# Patient Record
Sex: FEMALE | Race: OTHER | Employment: FULL TIME | URBAN - METROPOLITAN AREA
[De-identification: names, ages, dates, MRNs, and addresses within clinical notes are randomized per-mention and may not be internally consistent; named-entity substitution may affect disease eponyms.]

---

## 2017-10-14 ENCOUNTER — OCC HEALTH (OUTPATIENT)
Dept: OCCUPATIONAL MEDICINE | Age: 22
End: 2017-10-14
Attending: PREVENTIVE MEDICINE

## 2017-10-27 ENCOUNTER — APPOINTMENT (OUTPATIENT)
Dept: GENERAL RADIOLOGY | Age: 22
End: 2017-10-27
Attending: EMERGENCY MEDICINE
Payer: COMMERCIAL

## 2017-10-27 ENCOUNTER — HOSPITAL ENCOUNTER (OUTPATIENT)
Age: 22
Discharge: HOME | End: 2017-10-27
Attending: EMERGENCY MEDICINE
Payer: COMMERCIAL

## 2017-10-27 VITALS
TEMPERATURE: 98 F | HEART RATE: 75 BPM | SYSTOLIC BLOOD PRESSURE: 123 MMHG | RESPIRATION RATE: 18 BRPM | OXYGEN SATURATION: 99 % | DIASTOLIC BLOOD PRESSURE: 90 MMHG

## 2017-10-27 DIAGNOSIS — M54.12 CERVICAL RADICULAR PAIN: Primary | ICD-10-CM

## 2017-10-27 PROCEDURE — 73030 X-RAY EXAM OF SHOULDER: CPT | Performed by: EMERGENCY MEDICINE

## 2017-10-27 PROCEDURE — 99203 OFFICE O/P NEW LOW 30 MIN: CPT

## 2017-10-27 PROCEDURE — 72050 X-RAY EXAM NECK SPINE 4/5VWS: CPT | Performed by: EMERGENCY MEDICINE

## 2017-10-27 PROCEDURE — 99214 OFFICE O/P EST MOD 30 MIN: CPT

## 2017-10-27 RX ORDER — ORPHENADRINE CITRATE 100 MG/1
100 TABLET, EXTENDED RELEASE ORAL 2 TIMES DAILY
Qty: 10 TABLET | Refills: 0 | Status: SHIPPED | OUTPATIENT
Start: 2017-10-27 | End: 2017-10-27

## 2017-10-27 RX ORDER — ORPHENADRINE CITRATE 100 MG/1
100 TABLET, EXTENDED RELEASE ORAL 2 TIMES DAILY
Qty: 20 TABLET | Refills: 0 | Status: SHIPPED | OUTPATIENT
Start: 2017-10-27 | End: 2018-03-06

## 2017-10-27 RX ORDER — ORPHENADRINE CITRATE 100 MG/1
100 TABLET, EXTENDED RELEASE ORAL 2 TIMES DAILY
Qty: 10 TABLET | Refills: 0 | Status: SHIPPED | OUTPATIENT
Start: 2017-10-27 | End: 2017-11-03

## 2017-10-27 RX ORDER — HYDROCODONE BITARTRATE AND ACETAMINOPHEN 5; 325 MG/1; MG/1
1-2 TABLET ORAL EVERY 6 HOURS PRN
Qty: 20 TABLET | Refills: 0 | Status: SHIPPED | OUTPATIENT
Start: 2017-10-27 | End: 2017-11-06

## 2017-10-27 RX ORDER — NAPROXEN 500 MG/1
500 TABLET ORAL 2 TIMES DAILY PRN
Qty: 20 TABLET | Refills: 0 | Status: SHIPPED | OUTPATIENT
Start: 2017-10-27 | End: 2017-10-27

## 2017-10-27 RX ORDER — NAPROXEN 500 MG/1
500 TABLET ORAL 2 TIMES DAILY PRN
Qty: 20 TABLET | Refills: 0 | Status: SHIPPED | OUTPATIENT
Start: 2017-10-27 | End: 2017-11-03

## 2017-10-27 NOTE — ED NOTES
Pt upset due to long wait for prescription and discharge paper. pt informed writer will let provider know.   Dr. Chato Urbina aware

## 2017-10-27 NOTE — ED INITIAL ASSESSMENT (HPI)
States 5 days ago developed left shoulder pain with tingling down her left arm  Denies any trauma or injury

## 2017-10-27 NOTE — ED NOTES
Patient states she flew into town and came here directly to be assessed. States we are incompetent with her time and states she is upset because she has to catch another flight to [place] in a few hours and needs to be at the airport soon to check in.

## 2017-10-27 NOTE — ED PROVIDER NOTES
Patient Seen in: THE [facility] In [facility] END    History   Patient presents with:  Shoulder Pain    Stated Complaint: 5 days lt shoulder pain no fall or injury    HPI    Patient complains of pain in the left shoulder area that started about 5 day HPI.  Constitutional and vital signs reviewed. All other systems reviewed and negative except as noted above. PSFH elements reviewed from today and agreed except as otherwise stated in HPI.     Physical Exam   ED Triage Vitals [10/27/17 1411]  BP: 1 abnormality is seen. OTHER:  Negative    Shoulder series  There is no fracture or dislocation. There is no lytic or blastic lesions. The soft tissues are unremarkable    I recommend rest, anti-inflammatories and muscle relaxants.   If patient's pain does

## 2018-03-06 ENCOUNTER — HOSPITAL ENCOUNTER (OUTPATIENT)
Dept: ULTRASOUND IMAGING | Age: 23
Discharge: HOME | End: 2018-03-06
Attending: OBSTETRICS & GYNECOLOGY
Payer: COMMERCIAL

## 2018-03-06 ENCOUNTER — OFFICE VISIT (OUTPATIENT)
Dept: OBGYN CLINIC | Facility: CLINIC | Age: 23
End: 2018-03-06

## 2018-03-06 VITALS
HEIGHT: 62 IN | HEART RATE: 62 BPM | SYSTOLIC BLOOD PRESSURE: 102 MMHG | WEIGHT: 122.19 LBS | DIASTOLIC BLOOD PRESSURE: 60 MMHG | BODY MASS INDEX: 22.48 KG/M2

## 2018-03-06 DIAGNOSIS — N94.10 DYSPAREUNIA IN FEMALE: ICD-10-CM

## 2018-03-06 DIAGNOSIS — Z01.419 WELL FEMALE EXAM WITH ROUTINE GYNECOLOGICAL EXAM: Primary | ICD-10-CM

## 2018-03-06 DIAGNOSIS — Z11.3 SCREENING EXAMINATION FOR STD (SEXUALLY TRANSMITTED DISEASE): ICD-10-CM

## 2018-03-06 DIAGNOSIS — Z30.41 ENCOUNTER FOR SURVEILLANCE OF CONTRACEPTIVE PILLS: ICD-10-CM

## 2018-03-06 PROCEDURE — 76856 US EXAM PELVIC COMPLETE: CPT | Performed by: OBSTETRICS & GYNECOLOGY

## 2018-03-06 PROCEDURE — 76830 TRANSVAGINAL US NON-OB: CPT | Performed by: OBSTETRICS & GYNECOLOGY

## 2018-03-06 PROCEDURE — 87591 N.GONORRHOEAE DNA AMP PROB: CPT | Performed by: OBSTETRICS & GYNECOLOGY

## 2018-03-06 PROCEDURE — 99385 PREV VISIT NEW AGE 18-39: CPT | Performed by: OBSTETRICS & GYNECOLOGY

## 2018-03-06 PROCEDURE — 87491 CHLMYD TRACH DNA AMP PROBE: CPT | Performed by: OBSTETRICS & GYNECOLOGY

## 2018-03-06 RX ORDER — DROSPIRENONE AND ETHINYL ESTRADIOL 0.03MG-3MG
1 KIT ORAL
Qty: 3 PACKAGE | Refills: 3 | Status: SHIPPED | OUTPATIENT
Start: 2018-03-06 | End: 2019-05-20

## 2018-03-06 NOTE — PROGRESS NOTES
GYN H&P     3/6/2018  12:20 PM    CC: Patient is here for annual    HPI: patient is a 21year old  here for her annual gyn exam.   She has complaints pf dyspareunia. Menses are regular. Denies any pelvic pain or irregular vaginal discharge.    States Smoking status: Never Smoker    Smokeless tobacco: Never Used    Alcohol use Yes    Comment: socail    Drug use: No    Sexual activity: Yes    Partners: Male    Birth control/ protection: Pill     Other Topics Concern    Caffeine Concern Yes    Rashid Almonte appearing, no lesions. Urethral meatus appear wnl, no abnormal discharge or lesions noted.  Clitoris appear normal          Bladder: well supported, urethra wnl, no lesions or fissures                     Vagina: normal pink mucosa, no lesions, normal clear

## 2018-03-07 ENCOUNTER — PATIENT MESSAGE (OUTPATIENT)
Dept: OBGYN CLINIC | Facility: CLINIC | Age: 23
End: 2018-03-07

## 2018-03-07 DIAGNOSIS — N94.10 DYSPAREUNIA IN FEMALE: Primary | ICD-10-CM

## 2018-03-07 LAB
C TRACH DNA SPEC QL NAA+PROBE: NEGATIVE
N GONORRHOEA DNA SPEC QL NAA+PROBE: NEGATIVE

## 2018-03-07 NOTE — TELEPHONE ENCOUNTER
From: Junie Kumar RN  To: Cherelle Giraldo  Sent: 3/7/2018 9:36 AM CST  Subject: Results    Dr. Lena Trivedi reviewed your ultrasound and it is normal.    If you are interested in seeing pelvic medicine for ongoing pelvic pain we can refer you t

## 2019-04-01 ENCOUNTER — HOSPITAL ENCOUNTER (OUTPATIENT)
Dept: GENERAL RADIOLOGY | Age: 24
Discharge: HOME | End: 2019-04-01
Attending: INTERNAL MEDICINE
Payer: COMMERCIAL

## 2019-04-01 ENCOUNTER — OFFICE VISIT (OUTPATIENT)
Dept: RHEUMATOLOGY | Facility: CLINIC | Age: 24
End: 2019-04-01
Payer: COMMERCIAL

## 2019-04-01 ENCOUNTER — LAB ENCOUNTER (OUTPATIENT)
Dept: LAB | Age: 24
End: 2019-04-01
Attending: INTERNAL MEDICINE
Payer: COMMERCIAL

## 2019-04-01 VITALS
DIASTOLIC BLOOD PRESSURE: 80 MMHG | HEART RATE: 94 BPM | HEIGHT: 62 IN | WEIGHT: 112 LBS | SYSTOLIC BLOOD PRESSURE: 112 MMHG | BODY MASS INDEX: 20.61 KG/M2

## 2019-04-01 DIAGNOSIS — M25.561 CHRONIC PAIN OF BOTH KNEES: ICD-10-CM

## 2019-04-01 DIAGNOSIS — M25.561 CHRONIC PAIN OF BOTH KNEES: Primary | ICD-10-CM

## 2019-04-01 DIAGNOSIS — R20.0 NUMBNESS AND TINGLING OF FOOT: ICD-10-CM

## 2019-04-01 DIAGNOSIS — M25.562 CHRONIC PAIN OF BOTH KNEES: Primary | ICD-10-CM

## 2019-04-01 DIAGNOSIS — M25.562 CHRONIC PAIN OF BOTH KNEES: ICD-10-CM

## 2019-04-01 DIAGNOSIS — G89.29 CHRONIC PAIN OF BOTH KNEES: Primary | ICD-10-CM

## 2019-04-01 DIAGNOSIS — G89.29 CHRONIC PAIN OF BOTH KNEES: ICD-10-CM

## 2019-04-01 DIAGNOSIS — R20.2 NUMBNESS AND TINGLING OF FOOT: ICD-10-CM

## 2019-04-01 PROCEDURE — 84100 ASSAY OF PHOSPHORUS: CPT

## 2019-04-01 PROCEDURE — 85652 RBC SED RATE AUTOMATED: CPT | Performed by: INTERNAL MEDICINE

## 2019-04-01 PROCEDURE — 85025 COMPLETE CBC W/AUTO DIFF WBC: CPT | Performed by: INTERNAL MEDICINE

## 2019-04-01 PROCEDURE — 82728 ASSAY OF FERRITIN: CPT

## 2019-04-01 PROCEDURE — 83550 IRON BINDING TEST: CPT

## 2019-04-01 PROCEDURE — 36415 COLL VENOUS BLD VENIPUNCTURE: CPT

## 2019-04-01 PROCEDURE — 83735 ASSAY OF MAGNESIUM: CPT

## 2019-04-01 PROCEDURE — 86200 CCP ANTIBODY: CPT | Performed by: INTERNAL MEDICINE

## 2019-04-01 PROCEDURE — 99244 OFF/OP CNSLTJ NEW/EST MOD 40: CPT | Performed by: INTERNAL MEDICINE

## 2019-04-01 PROCEDURE — 83540 ASSAY OF IRON: CPT

## 2019-04-01 PROCEDURE — 80053 COMPREHEN METABOLIC PANEL: CPT | Performed by: INTERNAL MEDICINE

## 2019-04-01 PROCEDURE — 73565 X-RAY EXAM OF KNEES: CPT | Performed by: INTERNAL MEDICINE

## 2019-04-01 PROCEDURE — 86140 C-REACTIVE PROTEIN: CPT | Performed by: INTERNAL MEDICINE

## 2019-04-01 PROCEDURE — 86038 ANTINUCLEAR ANTIBODIES: CPT

## 2019-04-01 PROCEDURE — 84550 ASSAY OF BLOOD/URIC ACID: CPT | Performed by: INTERNAL MEDICINE

## 2019-04-01 PROCEDURE — 86431 RHEUMATOID FACTOR QUANT: CPT | Performed by: INTERNAL MEDICINE

## 2019-04-01 PROCEDURE — 86812 HLA TYPING A B OR C: CPT

## 2019-04-01 RX ORDER — BUPROPION HYDROCHLORIDE 150 MG/1
TABLET ORAL
Refills: 1 | COMMUNITY
Start: 2019-02-25 | End: 2019-05-10

## 2019-04-01 RX ORDER — DEXTROAMPHETAMINE SACCHARATE, AMPHETAMINE ASPARTATE MONOHYDRATE, DEXTROAMPHETAMINE SULFATE AND AMPHETAMINE SULFATE 3.75; 3.75; 3.75; 3.75 MG/1; MG/1; MG/1; MG/1
CAPSULE, EXTENDED RELEASE ORAL
Refills: 0 | COMMUNITY
Start: 2019-02-28 | End: 2019-05-10

## 2019-04-01 NOTE — PATIENT INSTRUCTIONS
- you were seen today for knee pain  - need to get blood work and xrays done to further evaluate  - may need to get MRI of the knee's if blood work and xrays are not helpful with diagnosis

## 2019-04-01 NOTE — PROGRESS NOTES
Lilly Amezquita is a 25year old female who presents for Patient presents with:  Consult: Bilateral R the most; Knee pain x3 yrs. Stiffness. Has gotten worse  Foot Pain: Bilateral; Tingling sensation, numbess. Oneal Claude    HPI:     I had the pleasure of seeing Kulwinder Haq kg)  11/23/18 : 126 lb (57.2 kg)    Body mass index is 20.49 kg/m².         Current Outpatient Medications:  buPROPion HCl ER, XL, 150 MG Oral Tablet 24 Hr TK 1 T PO D UTD Disp:  Rfl: 1   Amphetamine-Dextroamphet ER 15 MG Oral Capsule SR 24 Hr TK 1 C PO ONC Raynaud's, no nasal ulcers, no parotid swelling, no neck pain, no jaw pain, no temple pain  Eyes: No visual changes,   CVS: No chest pain, no heart disease  RS: No SOB, no Cough, No Pleurtic pain,   GI: No nausea, no vomiiting, no abominal pain, no hx of u may be related to muscle spasm. XR shoulder 10/2017:  No acute bony injury to the left shoulder. ASSESSMENT AND PLAN:   Lilly Amezquita is a 25year old female with ADHD, Depression referred for bilateral knee pain.   She has had bilateral knee pain f

## 2019-04-04 ENCOUNTER — TELEPHONE (OUTPATIENT)
Dept: RHEUMATOLOGY | Facility: CLINIC | Age: 24
End: 2019-04-04

## 2019-04-04 DIAGNOSIS — M25.561 CHRONIC PAIN OF RIGHT KNEE: Primary | ICD-10-CM

## 2019-04-04 DIAGNOSIS — G89.29 CHRONIC PAIN OF RIGHT KNEE: Primary | ICD-10-CM

## 2019-04-04 NOTE — TELEPHONE ENCOUNTER
Pt would like to know if MRI has been authorized from the insurance pt will be leaving [facility] on Saturday.

## 2019-04-04 NOTE — TELEPHONE ENCOUNTER
[facility] contacted. They do not have clinic guidelines to R/O inflammatory arthritis. MRI right knee approved from 4/4/19 to 5/3/19 #[id number]. LMTCB for pt.

## 2019-04-04 NOTE — TELEPHONE ENCOUNTER
PA started on [facility] for MRI right knee. Will need to contact [facility] to provide additional clinical information.

## 2019-04-05 ENCOUNTER — HOSPITAL ENCOUNTER (OUTPATIENT)
Dept: MRI IMAGING | Age: 24
Discharge: HOME | End: 2019-04-05
Attending: INTERNAL MEDICINE
Payer: COMMERCIAL

## 2019-04-05 DIAGNOSIS — G89.29 CHRONIC PAIN OF RIGHT KNEE: ICD-10-CM

## 2019-04-05 DIAGNOSIS — M25.561 CHRONIC PAIN OF RIGHT KNEE: ICD-10-CM

## 2019-04-05 PROCEDURE — 73723 MRI JOINT LWR EXTR W/O&W/DYE: CPT | Performed by: INTERNAL MEDICINE

## 2019-04-05 PROCEDURE — A9575 INJ GADOTERATE MEGLUMI 0.1ML: HCPCS | Performed by: INTERNAL MEDICINE

## 2019-04-23 ENCOUNTER — TELEPHONE (OUTPATIENT)
Dept: OBGYN CLINIC | Facility: CLINIC | Age: 24
End: 2019-04-23

## 2019-04-23 NOTE — TELEPHONE ENCOUNTER
24 y/o called c/o irregular bleeding. She stated she started bleeding the second Sunday of her pack. States she has missed 2-3 pills but has taken them the next day. Patient stated her bleeding is not heavy. She denies any abdominal pain or cramping.  Marta

## 2019-04-23 NOTE — TELEPHONE ENCOUNTER
This is patient's first episode of irregular bleeding. Patient informed of recommendations. She stated she will monitor since she just started new pack 2 days ago. If it continues she will call back to schedule.

## 2019-04-23 NOTE — TELEPHONE ENCOUNTER
Patient states that she has had her menses for 10 days. She is on birth control. At what point should she be concerned. Please call patient.

## 2019-04-23 NOTE — TELEPHONE ENCOUNTER
Is this first episode of irreg bleeding?     And   Yes the missing of pills 2-3 x in a pack can cause irreg bleeding and make them not effective (even with making them up)    She is overdue for annual.

## 2019-04-29 NOTE — TELEPHONE ENCOUNTER
Pt is out of town in [facility] but still bleeding    Pt is scheduled May 10th w/Dr. Hamilton Fernando    Pt wants to talk with a nurse to see if she needs to go somewhere in [facility]    Please call

## 2019-04-29 NOTE — TELEPHONE ENCOUNTER
Patient called back. Still with bleeding. States it is light. Denies any severe abdominal pain or cramping. She states she is having some shortness of breath.  She is taking the generic of adderall and states that a common side effect is shortness of breath

## 2019-04-29 NOTE — TELEPHONE ENCOUNTER
I agree with your recommendations. If this is not satisfactory to her, then go to the nearest urgent care or ER. Acute sob is more worrisome than light bleeding.

## 2019-05-10 ENCOUNTER — OFFICE VISIT (OUTPATIENT)
Dept: OBGYN CLINIC | Facility: CLINIC | Age: 24
End: 2019-05-10
Payer: COMMERCIAL

## 2019-05-10 VITALS
HEART RATE: 109 BPM | SYSTOLIC BLOOD PRESSURE: 106 MMHG | HEIGHT: 62 IN | DIASTOLIC BLOOD PRESSURE: 62 MMHG | BODY MASS INDEX: 20.06 KG/M2 | WEIGHT: 109 LBS

## 2019-05-10 DIAGNOSIS — N92.1 BREAKTHROUGH BLEEDING ON BIRTH CONTROL PILLS: Primary | ICD-10-CM

## 2019-05-10 DIAGNOSIS — N94.2 VAGINISMUS: ICD-10-CM

## 2019-05-10 DIAGNOSIS — Z11.3 SCREEN FOR STD (SEXUALLY TRANSMITTED DISEASE): ICD-10-CM

## 2019-05-10 PROCEDURE — 87491 CHLMYD TRACH DNA AMP PROBE: CPT | Performed by: OBSTETRICS & GYNECOLOGY

## 2019-05-10 PROCEDURE — 99213 OFFICE O/P EST LOW 20 MIN: CPT | Performed by: OBSTETRICS & GYNECOLOGY

## 2019-05-10 PROCEDURE — 87510 GARDNER VAG DNA DIR PROBE: CPT | Performed by: OBSTETRICS & GYNECOLOGY

## 2019-05-10 PROCEDURE — 87480 CANDIDA DNA DIR PROBE: CPT | Performed by: OBSTETRICS & GYNECOLOGY

## 2019-05-10 PROCEDURE — 87591 N.GONORRHOEAE DNA AMP PROB: CPT | Performed by: OBSTETRICS & GYNECOLOGY

## 2019-05-10 PROCEDURE — 87660 TRICHOMONAS VAGIN DIR PROBE: CPT | Performed by: OBSTETRICS & GYNECOLOGY

## 2019-05-10 NOTE — PROGRESS NOTES
CC: Patient presents with:  Vaginal Bleeding: Pt states that she has been bleeding for about 1 month. HPI: Merry Merrill is a 25year old here due to bleeding x 1 month. She stated that there are no associated symptoms.    Her bleeding is light and Problem Relation Age of Onset   • Diabetes Maternal Grandfather    • Other (CAD) Maternal Grandfather    • Hypertension Father    • Diabetes Father    • Other (Hyperlipidemia) Father    • Other (Nephrolithiasis) Father    • Lipids Maternal Grandmother that RN is not qualified to give this recommendation over the phone and she needed to be evaluated by MD. She asked  to talk to clinical supervisor and complain.    I did perform vaginitis and GC/CT swabs here today   Informed her of possible vagi

## 2019-05-10 NOTE — TELEPHONE ENCOUNTER
Patient was concerned that her visit today was a waste of time because her plan of care could have been given to her over the phone.   Patient was reminded about the importance of seeing a provider especially if her medical issue isn't being resolved over t

## 2019-05-18 ENCOUNTER — TELEPHONE (OUTPATIENT)
Dept: OBGYN CLINIC | Facility: CLINIC | Age: 24
End: 2019-05-18

## 2019-05-18 DIAGNOSIS — Z30.41 ENCOUNTER FOR SURVEILLANCE OF CONTRACEPTIVE PILLS: ICD-10-CM

## 2019-05-18 NOTE — TELEPHONE ENCOUNTER
Pt needing refill on birth control. Pt is due to start pack Sunday. Advised pt refill wouldn't be processed til Monday. She informs she will be out of town on Monday so requesting it to go to [place] in [place]. Please call pt when sent.      Pharma

## 2019-05-20 RX ORDER — DROSPIRENONE AND ETHINYL ESTRADIOL 0.03MG-3MG
1 KIT ORAL
Qty: 3 PACKAGE | Refills: 0 | Status: SHIPPED | OUTPATIENT
Start: 2019-05-20 | End: 2019-07-19

## 2019-05-20 NOTE — TELEPHONE ENCOUNTER
Last OV: 5/10/19 with Dr. Maxwell Gutierrez for gyn problem visit- breakthrough bleeding on ocp; last annual 3/6/18  Last refill date: 3/6/18  Follow-up: prn  Next appt.: none scheduled; overdue for annual      90-day supply sent to [facility] in [place] on file.  Call to amanda

## 2019-07-02 ENCOUNTER — PATIENT MESSAGE (OUTPATIENT)
Dept: OBGYN CLINIC | Facility: CLINIC | Age: 24
End: 2019-07-02

## 2019-07-02 RX ORDER — FLUCONAZOLE 150 MG/1
TABLET ORAL
Qty: 2 TABLET | Refills: 0 | Status: SHIPPED | OUTPATIENT
Start: 2019-07-02 | End: 2019-10-12

## 2019-07-02 NOTE — TELEPHONE ENCOUNTER
From: Ibeth Hope  To:  Ariana Villalobos MD  Sent: 7/2/2019 8:25 AM CDT  Subject: Non-Urgent Medical Question    Hi,     I appear to be having symptoms of a yeast infection - excessive white discharge, extreme itchiness, feels like my vulva/vagina is inflam

## 2019-07-19 DIAGNOSIS — Z30.41 ENCOUNTER FOR SURVEILLANCE OF CONTRACEPTIVE PILLS: ICD-10-CM

## 2019-07-19 RX ORDER — DROSPIRENONE AND ETHINYL ESTRADIOL 0.03MG-3MG
KIT ORAL
Qty: 84 TABLET | Refills: 0 | Status: SHIPPED | OUTPATIENT
Start: 2019-07-19 | End: 2019-10-12

## 2019-07-19 NOTE — TELEPHONE ENCOUNTER
Pt upset that when she came in in May 2019 her annual pap was not done. Says she requested office to do that and they denied her. appt has been scheduled, but pt unsure if she will have to change it when it gets closer.   She is out of state until the en

## 2019-10-12 ENCOUNTER — OFFICE VISIT (OUTPATIENT)
Dept: OBGYN CLINIC | Facility: CLINIC | Age: 24
End: 2019-10-12
Payer: COMMERCIAL

## 2019-10-12 VITALS
HEART RATE: 77 BPM | BODY MASS INDEX: 21.57 KG/M2 | HEIGHT: 62 IN | WEIGHT: 117.19 LBS | DIASTOLIC BLOOD PRESSURE: 68 MMHG | SYSTOLIC BLOOD PRESSURE: 104 MMHG

## 2019-10-12 DIAGNOSIS — Z01.419 WELL FEMALE EXAM WITH ROUTINE GYNECOLOGICAL EXAM: Primary | ICD-10-CM

## 2019-10-12 DIAGNOSIS — Z12.4 CERVICAL CANCER SCREENING: ICD-10-CM

## 2019-10-12 DIAGNOSIS — Z30.41 ENCOUNTER FOR SURVEILLANCE OF CONTRACEPTIVE PILLS: ICD-10-CM

## 2019-10-12 PROCEDURE — 88175 CYTOPATH C/V AUTO FLUID REDO: CPT | Performed by: NURSE PRACTITIONER

## 2019-10-12 PROCEDURE — 99395 PREV VISIT EST AGE 18-39: CPT | Performed by: NURSE PRACTITIONER

## 2019-10-12 RX ORDER — DROSPIRENONE AND ETHINYL ESTRADIOL 0.03MG-3MG
1 KIT ORAL
Qty: 84 TABLET | Refills: 3 | Status: SHIPPED | OUTPATIENT
Start: 2019-10-12 | End: 2020-05-11

## 2019-10-12 NOTE — PROGRESS NOTES
Here for Routine Annual Exam  No concerns or questions. Menses are typically regular. She has had some irregular bleeding with her pill, she has missed on occasion. Contraception0 OCP, she is off right now. No C/O, declines STD screen.     ROS: No Ca

## 2020-05-11 ENCOUNTER — TELEPHONE (OUTPATIENT)
Dept: OBGYN CLINIC | Facility: CLINIC | Age: 25
End: 2020-05-11

## 2020-05-11 DIAGNOSIS — Z30.41 ENCOUNTER FOR SURVEILLANCE OF CONTRACEPTIVE PILLS: ICD-10-CM

## 2020-05-11 RX ORDER — DROSPIRENONE AND ETHINYL ESTRADIOL 0.03MG-3MG
1 KIT ORAL
Qty: 84 TABLET | Refills: 1 | Status: SHIPPED | OUTPATIENT
Start: 2020-05-11 | End: 2020-09-09 | Stop reason: ALTCHOICE

## 2020-05-11 NOTE — TELEPHONE ENCOUNTER
Patient needs refill on birth control. The [facility] she was using closed down. She would like it sent to the [facility] listed as I updated her information. Please call patient once this has been done.

## 2020-05-11 NOTE — TELEPHONE ENCOUNTER
Patient changed pharmacies. Her last annual was 10/12/2019. Refill sent to pharmacy x 6 months and advised patient to call to schedule annual in September. No further questions or concerns.

## 2020-07-28 ENCOUNTER — HOSPITAL ENCOUNTER (EMERGENCY)
Facility: HOSPITAL | Age: 25
Discharge: HOME | End: 2020-07-28
Attending: EMERGENCY MEDICINE
Payer: COMMERCIAL

## 2020-07-28 ENCOUNTER — APPOINTMENT (OUTPATIENT)
Dept: ULTRASOUND IMAGING | Facility: HOSPITAL | Age: 25
End: 2020-07-28
Attending: EMERGENCY MEDICINE
Payer: COMMERCIAL

## 2020-07-28 VITALS
HEIGHT: 62 IN | WEIGHT: 117.31 LBS | HEART RATE: 97 BPM | DIASTOLIC BLOOD PRESSURE: 89 MMHG | TEMPERATURE: 98 F | BODY MASS INDEX: 21.59 KG/M2 | SYSTOLIC BLOOD PRESSURE: 128 MMHG | RESPIRATION RATE: 16 BRPM | OXYGEN SATURATION: 100 %

## 2020-07-28 DIAGNOSIS — R10.2 PELVIC PAIN: Primary | ICD-10-CM

## 2020-07-28 LAB
ANION GAP SERPL CALC-SCNC: 2 MMOL/L (ref 0–18)
BASOPHILS # BLD AUTO: 0.08 X10(3) UL (ref 0–0.2)
BASOPHILS NFR BLD AUTO: 0.8 %
BILIRUB UR QL STRIP.AUTO: NEGATIVE
BUN BLD-MCNC: 5 MG/DL (ref 7–18)
BUN/CREAT SERPL: 6.7 (ref 10–20)
CALCIUM BLD-MCNC: 9.4 MG/DL (ref 8.5–10.1)
CHLORIDE SERPL-SCNC: 106 MMOL/L (ref 98–112)
CLARITY UR REFRACT.AUTO: CLEAR
CO2 SERPL-SCNC: 26 MMOL/L (ref 21–32)
CREAT BLD-MCNC: 0.75 MG/DL (ref 0.55–1.02)
DEPRECATED RDW RBC AUTO: 38 FL (ref 35.1–46.3)
EOSINOPHIL # BLD AUTO: 0.13 X10(3) UL (ref 0–0.7)
EOSINOPHIL NFR BLD AUTO: 1.3 %
ERYTHROCYTE [DISTWIDTH] IN BLOOD BY AUTOMATED COUNT: 11.4 % (ref 11–15)
GLUCOSE BLD-MCNC: 92 MG/DL (ref 70–99)
GLUCOSE UR STRIP.AUTO-MCNC: NEGATIVE MG/DL
HCG SERPL QL: NEGATIVE
HCT VFR BLD AUTO: 47.9 % (ref 35–48)
HGB BLD-MCNC: 15.8 G/DL (ref 12–16)
IMM GRANULOCYTES # BLD AUTO: 0.02 X10(3) UL (ref 0–1)
IMM GRANULOCYTES NFR BLD: 0.2 %
KETONES UR STRIP.AUTO-MCNC: NEGATIVE MG/DL
LYMPHOCYTES # BLD AUTO: 2.92 X10(3) UL (ref 1–4)
LYMPHOCYTES NFR BLD AUTO: 29.7 %
MCH RBC QN AUTO: 30.2 PG (ref 26–34)
MCHC RBC AUTO-ENTMCNC: 33 G/DL (ref 31–37)
MCV RBC AUTO: 91.4 FL (ref 80–100)
MONOCYTES # BLD AUTO: 0.62 X10(3) UL (ref 0.1–1)
MONOCYTES NFR BLD AUTO: 6.3 %
NEUTROPHILS # BLD AUTO: 6.06 X10 (3) UL (ref 1.5–7.7)
NEUTROPHILS # BLD AUTO: 6.06 X10(3) UL (ref 1.5–7.7)
NEUTROPHILS NFR BLD AUTO: 61.7 %
NITRITE UR QL STRIP.AUTO: NEGATIVE
OSMOLALITY SERPL CALC.SUM OF ELEC: 275 MOSM/KG (ref 275–295)
PH UR STRIP.AUTO: 7 [PH] (ref 4.5–8)
PLATELET # BLD AUTO: 301 10(3)UL (ref 150–450)
POCT LOT NUMBER: NORMAL
POCT URINE PREGNANCY: NEGATIVE
POTASSIUM SERPL-SCNC: 3.7 MMOL/L (ref 3.5–5.1)
PROT UR STRIP.AUTO-MCNC: NEGATIVE MG/DL
RBC # BLD AUTO: 5.24 X10(6)UL (ref 3.8–5.3)
RBC UR QL AUTO: NEGATIVE
SODIUM SERPL-SCNC: 134 MMOL/L (ref 136–145)
SP GR UR STRIP.AUTO: 1 (ref 1–1.03)
UROBILINOGEN UR STRIP.AUTO-MCNC: <2 MG/DL
WBC # BLD AUTO: 9.8 X10(3) UL (ref 4–11)

## 2020-07-28 PROCEDURE — 87491 CHLMYD TRACH DNA AMP PROBE: CPT | Performed by: EMERGENCY MEDICINE

## 2020-07-28 PROCEDURE — 80048 BASIC METABOLIC PNL TOTAL CA: CPT | Performed by: EMERGENCY MEDICINE

## 2020-07-28 PROCEDURE — 84703 CHORIONIC GONADOTROPIN ASSAY: CPT | Performed by: EMERGENCY MEDICINE

## 2020-07-28 PROCEDURE — 85025 COMPLETE CBC W/AUTO DIFF WBC: CPT | Performed by: EMERGENCY MEDICINE

## 2020-07-28 PROCEDURE — 76830 TRANSVAGINAL US NON-OB: CPT | Performed by: EMERGENCY MEDICINE

## 2020-07-28 PROCEDURE — 87591 N.GONORRHOEAE DNA AMP PROB: CPT | Performed by: EMERGENCY MEDICINE

## 2020-07-28 PROCEDURE — 87480 CANDIDA DNA DIR PROBE: CPT | Performed by: EMERGENCY MEDICINE

## 2020-07-28 PROCEDURE — 96374 THER/PROPH/DIAG INJ IV PUSH: CPT

## 2020-07-28 PROCEDURE — 96361 HYDRATE IV INFUSION ADD-ON: CPT

## 2020-07-28 PROCEDURE — 93975 VASCULAR STUDY: CPT | Performed by: EMERGENCY MEDICINE

## 2020-07-28 PROCEDURE — 81025 URINE PREGNANCY TEST: CPT

## 2020-07-28 PROCEDURE — 76856 US EXAM PELVIC COMPLETE: CPT | Performed by: EMERGENCY MEDICINE

## 2020-07-28 PROCEDURE — 81001 URINALYSIS AUTO W/SCOPE: CPT | Performed by: EMERGENCY MEDICINE

## 2020-07-28 PROCEDURE — 87510 GARDNER VAG DNA DIR PROBE: CPT | Performed by: EMERGENCY MEDICINE

## 2020-07-28 PROCEDURE — 87660 TRICHOMONAS VAGIN DIR PROBE: CPT | Performed by: EMERGENCY MEDICINE

## 2020-07-28 PROCEDURE — 99284 EMERGENCY DEPT VISIT MOD MDM: CPT

## 2020-07-28 RX ORDER — GABAPENTIN 300 MG/1
1200 CAPSULE ORAL 2 TIMES DAILY
COMMUNITY
End: 2020-12-30

## 2020-07-28 RX ORDER — ZOLPIDEM TARTRATE 10 MG/1
10 TABLET ORAL NIGHTLY PRN
COMMUNITY
End: 2020-10-02

## 2020-07-28 RX ORDER — ONDANSETRON 2 MG/ML
4 INJECTION INTRAMUSCULAR; INTRAVENOUS ONCE
Status: COMPLETED | OUTPATIENT
Start: 2020-07-28 | End: 2020-07-28

## 2020-07-28 RX ORDER — DEXTROAMPHETAMINE SACCHARATE, AMPHETAMINE ASPARTATE, DEXTROAMPHETAMINE SULFATE AND AMPHETAMINE SULFATE 3.75; 3.75; 3.75; 3.75 MG/1; MG/1; MG/1; MG/1
15 TABLET ORAL DAILY
COMMUNITY
End: 2020-10-02

## 2020-07-28 NOTE — ED PROVIDER NOTES
Patient Seen in: [facility] Emergency Department      History   Patient presents with:  Pelvic Pain    Stated Complaint: pelvic pain, back pain, vaginal discharge.       HPI    80-year-old female presents emergency room with chief complaint of pelvic 100 %   O2 Device None (Room air)       Current:/89   Pulse 97   Temp 98.4 °F (36.9 °C) (Temporal)   Resp 16   Ht 157.5 cm (5' 2\")   Wt 53.2 kg   LMP 07/07/2020   SpO2 100%   BMI 21.45 kg/m²         Physical Exam    GENERAL: Patient is awake, alert, on the individual orders.    POCT PREGNANCY, URINE   RAINBOW DRAW BLUE   RAINBOW DRAW LAVENDER   RAINBOW DRAW LIGHT GREEN   RAINBOW DRAW GOLD   CHLAMYDIA/GONOCOCCUS, BETO   VAGINITIS/VAGINOSIS, DNA PROBE   CBC W/ DIFFERENTIAL          We will check ultrasoun

## 2020-07-28 NOTE — ED INITIAL ASSESSMENT (HPI)
Pt aox4. Pt presents to ed from home accompanied by father. Pt c/o constant pelvic pain with nausea x 4 months. Pt was seen by an immediate care leticia rapp. Pt has US scheduled on Sunday.

## 2020-07-29 LAB
C TRACH DNA SPEC QL NAA+PROBE: NEGATIVE
N GONORRHOEA DNA SPEC QL NAA+PROBE: NEGATIVE

## 2020-08-05 ENCOUNTER — HOSPITAL ENCOUNTER (OUTPATIENT)
Dept: CT IMAGING | Age: 25
Discharge: HOME | End: 2020-08-05
Attending: INTERNAL MEDICINE
Payer: COMMERCIAL

## 2020-08-05 DIAGNOSIS — R11.0 NAUSEA: ICD-10-CM

## 2020-08-05 DIAGNOSIS — R10.30 LOWER ABDOMINAL PAIN: ICD-10-CM

## 2020-08-05 PROCEDURE — 74177 CT ABD & PELVIS W/CONTRAST: CPT | Performed by: INTERNAL MEDICINE

## 2020-08-17 ENCOUNTER — TELEPHONE (OUTPATIENT)
Dept: PAIN CLINIC | Facility: CLINIC | Age: 25
End: 2020-08-17

## 2020-08-17 NOTE — TELEPHONE ENCOUNTER
Patient's mother Bryce Sanders calling to request appt with Dr Miryea Leonardo for Low back pain. States Dr Mireya Leonardo sees her mother too.

## 2020-09-09 ENCOUNTER — TELEPHONE (OUTPATIENT)
Dept: SURGERY | Facility: CLINIC | Age: 25
End: 2020-09-09

## 2020-09-09 ENCOUNTER — TELEMEDICINE (OUTPATIENT)
Dept: RHEUMATOLOGY | Facility: CLINIC | Age: 25
End: 2020-09-09
Payer: COMMERCIAL

## 2020-09-09 ENCOUNTER — OFFICE VISIT (OUTPATIENT)
Dept: INTERNAL MEDICINE CLINIC | Facility: CLINIC | Age: 25
End: 2020-09-09
Payer: COMMERCIAL

## 2020-09-09 VITALS
SYSTOLIC BLOOD PRESSURE: 102 MMHG | DIASTOLIC BLOOD PRESSURE: 78 MMHG | RESPIRATION RATE: 18 BRPM | WEIGHT: 112 LBS | HEART RATE: 120 BPM | BODY MASS INDEX: 20.87 KG/M2 | TEMPERATURE: 97 F | HEIGHT: 61.61 IN

## 2020-09-09 DIAGNOSIS — R20.0 NUMBNESS AND TINGLING IN BOTH HANDS: Primary | ICD-10-CM

## 2020-09-09 DIAGNOSIS — G89.29 CHRONIC BILATERAL LOW BACK PAIN WITHOUT SCIATICA: ICD-10-CM

## 2020-09-09 DIAGNOSIS — F41.9 ANXIETY AND DEPRESSION: ICD-10-CM

## 2020-09-09 DIAGNOSIS — R20.2 NUMBNESS AND TINGLING OF BOTH LEGS: ICD-10-CM

## 2020-09-09 DIAGNOSIS — R20.0 NUMBNESS AND TINGLING OF BOTH LEGS: ICD-10-CM

## 2020-09-09 DIAGNOSIS — F32.A ANXIETY AND DEPRESSION: ICD-10-CM

## 2020-09-09 DIAGNOSIS — M54.50 CHRONIC BILATERAL LOW BACK PAIN WITHOUT SCIATICA: ICD-10-CM

## 2020-09-09 DIAGNOSIS — R53.83 FATIGUE, UNSPECIFIED TYPE: ICD-10-CM

## 2020-09-09 DIAGNOSIS — R53.1 GENERALIZED WEAKNESS: Primary | ICD-10-CM

## 2020-09-09 DIAGNOSIS — M25.50 ARTHRALGIA, UNSPECIFIED JOINT: ICD-10-CM

## 2020-09-09 DIAGNOSIS — R20.2 NUMBNESS AND TINGLING IN BOTH HANDS: Primary | ICD-10-CM

## 2020-09-09 DIAGNOSIS — M79.10 MYALGIA: ICD-10-CM

## 2020-09-09 PROCEDURE — 3078F DIAST BP <80 MM HG: CPT | Performed by: INTERNAL MEDICINE

## 2020-09-09 PROCEDURE — 3008F BODY MASS INDEX DOCD: CPT | Performed by: INTERNAL MEDICINE

## 2020-09-09 PROCEDURE — 3074F SYST BP LT 130 MM HG: CPT | Performed by: INTERNAL MEDICINE

## 2020-09-09 PROCEDURE — 99214 OFFICE O/P EST MOD 30 MIN: CPT | Performed by: INTERNAL MEDICINE

## 2020-09-09 PROCEDURE — 99204 OFFICE O/P NEW MOD 45 MIN: CPT | Performed by: INTERNAL MEDICINE

## 2020-09-09 RX ORDER — DULOXETIN HYDROCHLORIDE 60 MG/1
120 CAPSULE, DELAYED RELEASE ORAL DAILY
COMMUNITY
Start: 2020-08-27

## 2020-09-09 RX ORDER — ALPRAZOLAM 0.25 MG/1
1-2 TABLET ORAL AS NEEDED
COMMUNITY
Start: 2020-08-18 | End: 2020-10-02

## 2020-09-09 RX ORDER — NAPROXEN 500 MG/1
500 TABLET ORAL 2 TIMES DAILY WITH MEALS
Qty: 60 TABLET | Refills: 0 | Status: SHIPPED | OUTPATIENT
Start: 2020-09-09 | End: 2020-10-02

## 2020-09-09 RX ORDER — ERGOCALCIFEROL 1.25 MG/1
50000 CAPSULE ORAL WEEKLY
Qty: 8 CAPSULE | Refills: 0 | Status: SHIPPED | OUTPATIENT
Start: 2020-09-09 | End: 2020-10-29

## 2020-09-09 NOTE — TELEPHONE ENCOUNTER
patient requested appointment fup thru [facility] for Dr. Sylvie Canas   reached out to patient who indicated that she looked at Dr. Sylvie Canas availability and it is over two weeks out so she decided to go a different route as she can't wait that long.   I offered to make

## 2020-09-09 NOTE — PROGRESS NOTES
Thais Acosta is a 22year old female. HPI:   No chief complaint on file. Doximity virtual visit:    I had the pleasure of seeing Thais Acosta on 9/9/2020 for follow up, has new symptoms of generalized pain, numbness and tingling.      Current Me NSAIDs and meloxicam with minimal relief. She is also on codeine with minimal relief.   Denies any rash, lymphadenopathy, oral ulcers, DVT or PE, miscarriages, RP.     HISTORY:  Past Medical History:   Diagnosis Date   • Acute pharyngitis    • Allergic rhi <15 IU/mL <10   HLA-B27      Negative Negative     Imaging:     MRI R knee 4/2019:  CONCLUSION:  Unremarkable MRI of the knee without evidence of inflammatory arthritis or internal derangement to explain the patient's pain.     CT a/p 8/2020:  CONCLUSION:

## 2020-09-10 ENCOUNTER — TELEPHONE (OUTPATIENT)
Dept: RHEUMATOLOGY | Facility: CLINIC | Age: 25
End: 2020-09-10

## 2020-09-10 ENCOUNTER — PATIENT MESSAGE (OUTPATIENT)
Dept: RHEUMATOLOGY | Facility: CLINIC | Age: 25
End: 2020-09-10

## 2020-09-10 ENCOUNTER — TELEPHONE (OUTPATIENT)
Dept: INTERNAL MEDICINE CLINIC | Facility: CLINIC | Age: 25
End: 2020-09-10

## 2020-09-10 NOTE — PROGRESS NOTES
Ty Going  1/10/1995    Patient presents with:  Establish Care  Other: pt has \"multiple pains\" throughout her body, bought in list of multiple symptoms located around body      [address] is a 22year old female who presents with has bilateral breast soreness. She has no appetite. This has been taking a toll on her mentally, with anxiety and overall depressed mood.   She has become increasingly frustrated with undergoing several different evaluation modalities by several different Anxiety and depression    • Attention deficit disorder with hyperactivity(314.01)    • Decorative tattoo    • Depression    • Dysmenorrhea    • Dysthymic disorder    • Esophageal reflux    • Headache(784.0)     Migraines   • Loss of weight    • Mental diso quadrants. No distention. Negative psoas and iliopsoas signs. No masses, no organomegaly or hernias. Musculoskeletal: No edema. Tenderness to superficial palpation of the lumbar, thoracic, and cervical paraspinal muscles bilaterally.   No joint deformi concerned about the patient's wellbeing as she appears to be in obvious discomfort, both physically and mentally, and without sustained relief. I have encouraged her to undergo short periods of physical activity provided safety is ensured.   I feel she is

## 2020-09-10 NOTE — TELEPHONE ENCOUNTER
Please see below. Pt was seen in ER yesterday for increasing lower back pain which radiates through her upper and lower extremities. She was given Tordol IM injection in ER, pt reports no relief in symptoms.  She is asking that Dr. Fly Mario review lab work comp

## 2020-09-10 NOTE — TELEPHONE ENCOUNTER
Patient called in stating that she had a video visit with Dr. Kendy Obregon on 9/9. She states that yesterday evening she was in so much pain that she went to [facility] ER in [place].      They did testing for her and prescribed a pain medication, but patient state

## 2020-09-11 ENCOUNTER — PATIENT MESSAGE (OUTPATIENT)
Dept: RHEUMATOLOGY | Facility: CLINIC | Age: 25
End: 2020-09-11

## 2020-09-11 NOTE — TELEPHONE ENCOUNTER
Lyme antibody evaluation ordered. Please contact [facility] where patient provided a blood sample yesterday to add on my ordered lab. Thanks.

## 2020-09-11 NOTE — TELEPHONE ENCOUNTER
Spoke to Keith at [address] [place] [address] who is adding on Lyme Disease AB with Reflex#[id number]. Results will be available next Tues 9/15/20. FYI to ALICIA.

## 2020-09-11 NOTE — TELEPHONE ENCOUNTER
FYI - please see pt's update message below. Additional questions sent to pt for clarification. Lab results not yet available.

## 2020-09-11 NOTE — TELEPHONE ENCOUNTER
From: Zoe Abdullahi  To:  Katie Carter MD  Sent: 9/11/2020 2:20 PM CDT  Subject: Non-Urgent Medical Question    X-ray and mri results

## 2020-09-13 LAB
ANA SCREEN, IFA: NEGATIVE
B2 GLYCOPROTEIN I (IGA)AB: <9 SAU
B2 GLYCOPROTEIN I (IGG)AB: <9 SGU
B2 GLYCOPROTEIN I(IGM)AB: <9 SMU
C-REACTIVE PROTEIN: 13.5 MG/L
COMPLEMENT COMPONENT C3C: 152 MG/DL (ref 83–193)
COMPLEMENT COMPONENT C4C: 36 MG/DL (ref 15–57)
DNA (DS) ANTIBODY: 17 IU/ML
MYELOPEROXIDASE ANTIBODY: <1 AI
PROTEINASE-3 ANTIBODY: <1 AI
SED RATE BY MODIFIED$WESTERGREN: 14 MM/H

## 2020-09-14 ENCOUNTER — PATIENT MESSAGE (OUTPATIENT)
Dept: RHEUMATOLOGY | Facility: CLINIC | Age: 25
End: 2020-09-14

## 2020-09-14 ENCOUNTER — TELEPHONE (OUTPATIENT)
Dept: RHEUMATOLOGY | Facility: CLINIC | Age: 25
End: 2020-09-14

## 2020-09-14 NOTE — TELEPHONE ENCOUNTER
Several labs not completed at [facility], see 9/14 telephone encounter. Pt will be returning to [facility] to complete remaining labs.

## 2020-09-14 NOTE — TELEPHONE ENCOUNTER
From: Saw Marrero  To: Vannessa Dang MD  Sent: 9/14/2020 9:01 AM CDT  Subject: Test Results Question    Hi,    Per my blood work appearing normal, what are next steps for me? Thank you.

## 2020-09-14 NOTE — TELEPHONE ENCOUNTER
[facility] contacted and one specimen must be received frozen. Called patient back and advised she will have to return to [facility] to have the additional labs drawn. Lab orders faxed to [facility] at [phone].

## 2020-09-14 NOTE — TELEPHONE ENCOUNTER
I received some of his Catalina's blood work but [address] [place] [address] did not do 5 of them. If you can fax these blood work to [address] [place] [address] so she can get them done.  They are already ordered and says active on them     -Lupus anticoagulant, anticardiolipin, RNP, scleroderma and a

## 2020-09-15 ENCOUNTER — PATIENT MESSAGE (OUTPATIENT)
Dept: RHEUMATOLOGY | Facility: CLINIC | Age: 25
End: 2020-09-15

## 2020-09-15 LAB — LYME AB SCREEN: <0.9 INDEX

## 2020-09-15 NOTE — TELEPHONE ENCOUNTER
From: Hardik Arevalo  To: Noel Gross MD  Sent: 9/15/2020 10:26 AM CDT  Subject: Test Results Question    Hi. I got additional results from [facility] today. Are these the results Dr. Fauzia Ndiaye is waiting on and wants me to go retake? Please advise.

## 2020-09-16 ENCOUNTER — TELEPHONE (OUTPATIENT)
Dept: RHEUMATOLOGY | Facility: CLINIC | Age: 25
End: 2020-09-16

## 2020-09-16 ENCOUNTER — PATIENT MESSAGE (OUTPATIENT)
Dept: RHEUMATOLOGY | Facility: CLINIC | Age: 25
End: 2020-09-16

## 2020-09-16 DIAGNOSIS — R20.0 NUMBNESS AND TINGLING IN BOTH HANDS: Primary | ICD-10-CM

## 2020-09-16 DIAGNOSIS — R20.2 NUMBNESS AND TINGLING IN BOTH HANDS: Primary | ICD-10-CM

## 2020-09-16 DIAGNOSIS — R20.2 NUMBNESS AND TINGLING OF BOTH LEGS: ICD-10-CM

## 2020-09-16 DIAGNOSIS — R53.83 FATIGUE, UNSPECIFIED TYPE: ICD-10-CM

## 2020-09-16 DIAGNOSIS — R20.0 NUMBNESS AND TINGLING OF BOTH LEGS: ICD-10-CM

## 2020-09-16 NOTE — TELEPHONE ENCOUNTER
From: Toro Standard  To: Kassy Horner MD  Sent: 9/16/2020 11:17 AM CDT  Subject: Test Results Question    I have a question about C-REACTIVE PROTEIN resulted on 9/13/20, 3:13 AM.    Thank you. What does it being slightly positive indicate at this time?  A

## 2020-09-17 ENCOUNTER — LAB ENCOUNTER (OUTPATIENT)
Dept: LAB | Age: 25
End: 2020-09-17
Attending: INTERNAL MEDICINE
Payer: COMMERCIAL

## 2020-09-17 ENCOUNTER — TELEPHONE (OUTPATIENT)
Dept: RHEUMATOLOGY | Facility: CLINIC | Age: 25
End: 2020-09-17

## 2020-09-17 DIAGNOSIS — G89.29 CHRONIC BILATERAL LOW BACK PAIN WITHOUT SCIATICA: ICD-10-CM

## 2020-09-17 DIAGNOSIS — R20.0 NUMBNESS AND TINGLING IN BOTH HANDS: ICD-10-CM

## 2020-09-17 DIAGNOSIS — R20.2 NUMBNESS AND TINGLING IN BOTH HANDS: ICD-10-CM

## 2020-09-17 DIAGNOSIS — R20.2 NUMBNESS AND TINGLING OF BOTH LEGS: ICD-10-CM

## 2020-09-17 DIAGNOSIS — M54.50 CHRONIC BILATERAL LOW BACK PAIN WITHOUT SCIATICA: ICD-10-CM

## 2020-09-17 DIAGNOSIS — R20.0 NUMBNESS AND TINGLING OF BOTH LEGS: ICD-10-CM

## 2020-09-17 DIAGNOSIS — R53.83 FATIGUE, UNSPECIFIED TYPE: ICD-10-CM

## 2020-09-17 LAB
THYROGLOB SERPL-MCNC: 26 U/ML (ref ?–60)
THYROPEROXIDASE AB SERPL-ACNC: <28 U/ML (ref ?–60)
TSI SER-ACNC: 2.37 MIU/ML (ref 0.36–3.74)

## 2020-09-17 PROCEDURE — 86147 CARDIOLIPIN ANTIBODY EA IG: CPT

## 2020-09-17 PROCEDURE — 84443 ASSAY THYROID STIM HORMONE: CPT

## 2020-09-17 PROCEDURE — 86235 NUCLEAR ANTIGEN ANTIBODY: CPT

## 2020-09-17 PROCEDURE — 86800 THYROGLOBULIN ANTIBODY: CPT

## 2020-09-17 PROCEDURE — 82164 ANGIOTENSIN I ENZYME TEST: CPT

## 2020-09-17 PROCEDURE — 85705 THROMBOPLASTIN INHIBITION: CPT

## 2020-09-17 PROCEDURE — 83516 IMMUNOASSAY NONANTIBODY: CPT

## 2020-09-17 PROCEDURE — 85613 RUSSELL VIPER VENOM DILUTED: CPT

## 2020-09-17 PROCEDURE — 82787 IGG 1 2 3 OR 4 EACH: CPT

## 2020-09-17 PROCEDURE — 86376 MICROSOMAL ANTIBODY EACH: CPT

## 2020-09-17 PROCEDURE — 36415 COLL VENOUS BLD VENIPUNCTURE: CPT

## 2020-09-17 PROCEDURE — 85732 THROMBOPLASTIN TIME PARTIAL: CPT

## 2020-09-17 PROCEDURE — 85610 PROTHROMBIN TIME: CPT

## 2020-09-18 LAB
APTT PPP: 27.2 SECONDS (ref 25.4–36.1)
CARDIOLIPIN IGG SERPL-ACNC: 0.6 GPL (ref ?–10)
CARDIOLIPIN IGM SERPL-ACNC: 2.6 MPL (ref ?–10)
DRVVT LUPUS ANTICOAGULANT: NEGATIVE
DRVVT SCREEN RATIO: 1.18 (ref 0–1.29)
PT(LUPUS): 12.5 SECONDS (ref 12.1–14.7)
STACLOT LA DELTA: 2.5 SECONDS (ref ?–8)
STACLOT LA: NEGATIVE

## 2020-09-19 LAB
ANGIOTENSIN CONVERTING ENZYME: 26 U/L
IMMUNOGLOBULIN G SUBCLASS 1: 410 MG/DL
IMMUNOGLOBULIN G SUBCLASS 2: 299 MG/DL
IMMUNOGLOBULIN G SUBCLASS 3: 35 MG/DL
IMMUNOGLOBULIN G SUBCLASS 4: 17 MG/DL
SCLERODERMA (SCL-70) (ENA) AB, IGG: 6 AU/ML

## 2020-09-21 LAB
JO-1 AUTOAB: <100 AU/ML (ref ?–100)
RNP AUTOAB: <100 AU/ML (ref ?–100)

## 2020-09-23 ENCOUNTER — OFFICE VISIT (OUTPATIENT)
Dept: RHEUMATOLOGY | Facility: CLINIC | Age: 25
End: 2020-09-23
Payer: COMMERCIAL

## 2020-09-23 VITALS
DIASTOLIC BLOOD PRESSURE: 81 MMHG | SYSTOLIC BLOOD PRESSURE: 114 MMHG | HEART RATE: 111 BPM | WEIGHT: 112 LBS | HEIGHT: 61.61 IN | BODY MASS INDEX: 20.87 KG/M2

## 2020-09-23 DIAGNOSIS — R20.2 NUMBNESS AND TINGLING IN BOTH HANDS: Primary | ICD-10-CM

## 2020-09-23 DIAGNOSIS — M54.50 CHRONIC BILATERAL LOW BACK PAIN WITHOUT SCIATICA: ICD-10-CM

## 2020-09-23 DIAGNOSIS — R53.83 FATIGUE, UNSPECIFIED TYPE: ICD-10-CM

## 2020-09-23 DIAGNOSIS — G89.29 CHRONIC BILATERAL LOW BACK PAIN WITHOUT SCIATICA: ICD-10-CM

## 2020-09-23 DIAGNOSIS — R20.2 NUMBNESS AND TINGLING OF BOTH LEGS: ICD-10-CM

## 2020-09-23 DIAGNOSIS — R20.0 NUMBNESS AND TINGLING IN BOTH HANDS: Primary | ICD-10-CM

## 2020-09-23 DIAGNOSIS — R20.0 NUMBNESS AND TINGLING OF BOTH LEGS: ICD-10-CM

## 2020-09-23 PROCEDURE — 3079F DIAST BP 80-89 MM HG: CPT | Performed by: INTERNAL MEDICINE

## 2020-09-23 PROCEDURE — 3074F SYST BP LT 130 MM HG: CPT | Performed by: INTERNAL MEDICINE

## 2020-09-23 PROCEDURE — 99214 OFFICE O/P EST MOD 30 MIN: CPT | Performed by: INTERNAL MEDICINE

## 2020-09-23 PROCEDURE — 3008F BODY MASS INDEX DOCD: CPT | Performed by: INTERNAL MEDICINE

## 2020-09-23 RX ORDER — HYDROXYCHLOROQUINE SULFATE 200 MG/1
200 TABLET, FILM COATED ORAL DAILY
Qty: 90 TABLET | Refills: 0 | Status: SHIPPED | OUTPATIENT
Start: 2020-09-23 | End: 2021-01-06

## 2020-09-23 NOTE — PROGRESS NOTES
Kayli Hardwick is a 22year old female. HPI:   Patient presents with: Follow - Up  Joint Pain      I had the pleasure of seeing Kayli Hardwick on 9/23/2020 for follow up, has new symptoms of generalized pain, numbness and tingling.      Current Medica rule out MS. MRI of the brain was normal  Per patient she was also seen by a spinal doctor. She also was seen by chiropractor and physical therapy.  She did accupunture also  She is following with psychiatry and her medications have been adjusted, cymbalt Oral Cap Take 300 mg by mouth 3 (three) times daily. • Zolpidem Tartrate 10 MG Oral Tab Take 10 mg by mouth nightly as needed for Sleep. • amphetamine-dextroamphetamine 15 MG Oral Tab Take 15 mg by mouth daily.      • Sertraline HCl 50 MG Oral Tab T - 549 mg/dL 299   IMMUNOGLOBULIN G SUBCLASS 3      21 - 134 mg/dL 35   IMMUNOGLOBULIN G SUBCLASS 4      1 - 123 mg/dL 17   ANGIOTENSIN CONVERTING ENZYME,      9 - 67 U/L 26   ANTI-THYROGLOBULIN      <60 U/mL 26   ANTI-THYROPEROXIDASE      <60 U/mL <28   TS Blood work for inflammatory arthritis was negative  - Found to have negative RAJI, dsDNA was mildly positive but other work up for lupus was negative and her symptoms are not consistent with lupus.  Can do a trial of  mg daily to see if it helps  - Al

## 2020-09-23 NOTE — PATIENT INSTRUCTIONS
You were seen for symptoms of numbness and tingling in the arms and legs, weight loss, fatigue, tender to touch. Your blood work did show a positive double-stranded DNA but all your other blood work for lupus was negative.   My suspicion for lupus is low

## 2020-09-28 ENCOUNTER — TELEPHONE (OUTPATIENT)
Dept: SURGERY | Facility: CLINIC | Age: 25
End: 2020-09-28

## 2020-09-28 NOTE — TELEPHONE ENCOUNTER
Pt requesting NP apt, her mother is a pt of pain mgnt. Pt states she was referred by [facility] to see a pain specialist. Pt states her pain is all over her body.    Per Christa Correa RN pt should send over last 3 OV notes for  to review and determine

## 2020-09-30 ENCOUNTER — TELEPHONE (OUTPATIENT)
Dept: RHEUMATOLOGY | Facility: CLINIC | Age: 25
End: 2020-09-30

## 2020-09-30 LAB
EJ (GLYCYL - TRNA SYNTHETASE) ANTIBODY: NEGATIVE
FIBRILLARIN (U3 RNP) AB, IGG: NEGATIVE
JO-1 (HISTIDY1-TRNA SYNTHETASE) AB, IGG: 28 AU/ML
KU ANTIBODY: NEGATIVE
MDA5 (CADM-140) ANTIBODY: NEGATIVE
MI-2 (NUCLEAR HELICASE PROTEIN) ANTIBODY: NEGATIVE
NXP-2 (NUCLEAR MATRIX PROTEIN-2) AB: NEGATIVE
OJ (ISOLEUCYL-TRNA SYNTHETASE) ANTIBODY: NEGATIVE
P155/140 (TIF-1 GAMMA) ANTIBODY: NEGATIVE
PL-12 (ALANYL-TRNA SYNTHETASE) ANTIBODY: NEGATIVE
PL-7 (THREONYL-TRNA SYNTHETASE) ANTIBODY: NEGATIVE
PM_SCL 100 ANTIBODY, IGG: NEGATIVE
RIBONUCLEIC PROTEIN (U1) (ENA) AB, IGG: 15 AU/ML
SAE1 (SUMO ACTIVATING ENZYME) ANTIBODY: NEGATIVE
SRP (SINGLE RECOGNITION PARTICLE) AB: NEGATIVE
SSA 52 (RO) (ENA) ANTIBODY, IGG: 5 AU/ML
SSA 60 (RO) (ENA) ANTIBODY, IGG: 2 AU/ML
TIF1-GAMMA ANTIBODY: NEGATIVE

## 2020-09-30 NOTE — TELEPHONE ENCOUNTER
[facility] Loc contacted at [phone] #[id number] for PA request. Representative reported paid claim went through for hydroxychloroquine.

## 2020-10-02 ENCOUNTER — OFFICE VISIT (OUTPATIENT)
Dept: NEUROLOGY | Facility: CLINIC | Age: 25
End: 2020-10-02
Payer: COMMERCIAL

## 2020-10-02 VITALS
BODY MASS INDEX: 21 KG/M2 | DIASTOLIC BLOOD PRESSURE: 70 MMHG | HEART RATE: 74 BPM | RESPIRATION RATE: 16 BRPM | SYSTOLIC BLOOD PRESSURE: 122 MMHG | WEIGHT: 114 LBS

## 2020-10-02 DIAGNOSIS — M79.10 MYALGIA: ICD-10-CM

## 2020-10-02 DIAGNOSIS — R41.0 CONFUSION: ICD-10-CM

## 2020-10-02 DIAGNOSIS — R53.82 CHRONIC FATIGUE: ICD-10-CM

## 2020-10-02 DIAGNOSIS — R20.2 PARESTHESIAS: Primary | ICD-10-CM

## 2020-10-02 PROCEDURE — 3074F SYST BP LT 130 MM HG: CPT | Performed by: OTHER

## 2020-10-02 PROCEDURE — 3078F DIAST BP <80 MM HG: CPT | Performed by: OTHER

## 2020-10-02 PROCEDURE — 99204 OFFICE O/P NEW MOD 45 MIN: CPT | Performed by: OTHER

## 2020-10-02 RX ORDER — TRAZODONE HYDROCHLORIDE 50 MG/1
50 TABLET ORAL NIGHTLY
COMMUNITY
Start: 2020-09-18 | End: 2020-11-05 | Stop reason: ALTCHOICE

## 2020-10-02 RX ORDER — HYDROCODONE BITARTRATE AND ACETAMINOPHEN 5; 325 MG/1; MG/1
1 TABLET ORAL EVERY 6 HOURS PRN
COMMUNITY
Start: 2020-09-10 | End: 2020-12-30

## 2020-10-02 RX ORDER — ZALEPLON 5 MG/1
5 CAPSULE ORAL NIGHTLY
COMMUNITY
End: 2020-11-05 | Stop reason: ALTCHOICE

## 2020-10-02 NOTE — PROGRESS NOTES
Patient is having numbness and tingling in all extremities. Patient state this started in June. Patient is having decrease in balance and dizziness. Patient states short term memory loss and confusion started in June. Patient states speech issues.  Patient

## 2020-10-02 NOTE — PROGRESS NOTES
Neurology H&P    Sarkis Dumont Patient Status:  No patient class for patient encounter    1/10/1995 MRN [id number]   Location ED [facility], [address], [address] [facility] Attending No att. providers found   Hosp Day # 0 PCP Katie Marshall MD Hydroxychloroquine Sulfate 200 MG Oral Tab Take 1 tablet (200 mg total) by mouth daily. 90 tablet 0   • DULoxetine HCl 60 MG Oral Cap DR Particles Take 1 capsule by mouth daily.        • ergocalciferol 1.25 MG (02576 UT) Oral Cap Take 1 capsule (50,000 Unit (Hyperlipidemia) Father    • Other (Nephrolithiasis) Father    • Lipids Maternal Grandmother    • Other (Nephrolithiasis) Maternal Grandmother    • Diabetes Paternal Grandfather        Denies any FH of neurologic issues    ROS:  10 point ROS completed and normal    Assessment: This is a 23 y/o female with chronic symptoms of myalgias and neck pains an BLE pains and new various symptoms as noted in HPI. Her constellation of symptoms do not really fit into any one diagnosis.  Could be adrián fibromyalgia but see

## 2020-10-04 ENCOUNTER — APPOINTMENT (OUTPATIENT)
Dept: CT IMAGING | Facility: HOSPITAL | Age: 25
End: 2020-10-04
Attending: EMERGENCY MEDICINE
Payer: COMMERCIAL

## 2020-10-04 ENCOUNTER — HOSPITAL ENCOUNTER (EMERGENCY)
Facility: HOSPITAL | Age: 25
Discharge: HOME | End: 2020-10-04
Attending: EMERGENCY MEDICINE
Payer: COMMERCIAL

## 2020-10-04 VITALS
DIASTOLIC BLOOD PRESSURE: 89 MMHG | TEMPERATURE: 99 F | WEIGHT: 110 LBS | RESPIRATION RATE: 18 BRPM | HEART RATE: 82 BPM | HEIGHT: 62 IN | OXYGEN SATURATION: 100 % | SYSTOLIC BLOOD PRESSURE: 118 MMHG | BODY MASS INDEX: 20.24 KG/M2

## 2020-10-04 DIAGNOSIS — R10.13 EPIGASTRIC PAIN: Primary | ICD-10-CM

## 2020-10-04 PROCEDURE — 85025 COMPLETE CBC W/AUTO DIFF WBC: CPT | Performed by: EMERGENCY MEDICINE

## 2020-10-04 PROCEDURE — 74177 CT ABD & PELVIS W/CONTRAST: CPT | Performed by: EMERGENCY MEDICINE

## 2020-10-04 PROCEDURE — 85379 FIBRIN DEGRADATION QUANT: CPT | Performed by: EMERGENCY MEDICINE

## 2020-10-04 PROCEDURE — 99284 EMERGENCY DEPT VISIT MOD MDM: CPT

## 2020-10-04 PROCEDURE — 81001 URINALYSIS AUTO W/SCOPE: CPT | Performed by: EMERGENCY MEDICINE

## 2020-10-04 PROCEDURE — 96361 HYDRATE IV INFUSION ADD-ON: CPT

## 2020-10-04 PROCEDURE — 96374 THER/PROPH/DIAG INJ IV PUSH: CPT

## 2020-10-04 PROCEDURE — 81025 URINE PREGNANCY TEST: CPT

## 2020-10-04 PROCEDURE — 83690 ASSAY OF LIPASE: CPT | Performed by: EMERGENCY MEDICINE

## 2020-10-04 PROCEDURE — 80053 COMPREHEN METABOLIC PANEL: CPT | Performed by: EMERGENCY MEDICINE

## 2020-10-04 RX ORDER — ONDANSETRON 4 MG/1
4 TABLET, ORALLY DISINTEGRATING ORAL EVERY 4 HOURS PRN
Qty: 10 TABLET | Refills: 0 | Status: SHIPPED | OUTPATIENT
Start: 2020-10-04 | End: 2020-10-11

## 2020-10-04 RX ORDER — PANTOPRAZOLE SODIUM 20 MG/1
20 TABLET, DELAYED RELEASE ORAL DAILY
Qty: 30 TABLET | Refills: 0 | Status: SHIPPED | OUTPATIENT
Start: 2020-10-04 | End: 2020-11-03

## 2020-10-04 RX ORDER — ONDANSETRON 2 MG/ML
4 INJECTION INTRAMUSCULAR; INTRAVENOUS ONCE
Status: COMPLETED | OUTPATIENT
Start: 2020-10-04 | End: 2020-10-04

## 2020-10-04 NOTE — ED INITIAL ASSESSMENT (HPI)
A/O x 4. Patient presents with upper abdominal pain and nausea since yesterday. Patient states that she had had intermittent abdominal pain \"for a while\".   Denies any vomiting, diarrhea, urinary issues

## 2020-10-04 NOTE — ED PROVIDER NOTES
Patient Seen in: [facility] Emergency Department      History   Patient presents with:  Abdomen/Flank Pain    Stated Complaint: n/v/d, abd pain    HPI    43-year-old with a history of migraines, palpitations, GERD ADHD, anxiety and depression presen Review of Systems    Positive for stated complaint: n/v/d, abd pain  Other systems are as noted in HPI. Constitutional and vital signs reviewed. All other systems reviewed and negative except as noted above.     Physical Exam     ED Triage Vital -----------         ------                     CBC W/ DIFFERENTIAL[[id number]]                              Final result                 Please view results for these tests on the individual orders.    POCT PREGNANCY, URINE   RAINBO

## 2020-10-12 ENCOUNTER — TELEPHONE (OUTPATIENT)
Dept: NEUROLOGY | Facility: CLINIC | Age: 25
End: 2020-10-12

## 2020-11-05 ENCOUNTER — PROCEDURE VISIT (OUTPATIENT)
Dept: NEUROLOGY | Facility: CLINIC | Age: 25
End: 2020-11-05
Payer: COMMERCIAL

## 2020-11-05 DIAGNOSIS — M79.10 MYALGIA: ICD-10-CM

## 2020-11-05 DIAGNOSIS — R53.82 CHRONIC FATIGUE: ICD-10-CM

## 2020-11-05 DIAGNOSIS — R20.2 PARESTHESIAS: ICD-10-CM

## 2020-11-05 DIAGNOSIS — M54.2 CERVICALGIA: ICD-10-CM

## 2020-11-05 PROCEDURE — 95912 NRV CNDJ TEST 11-12 STUDIES: CPT | Performed by: OTHER

## 2020-11-05 PROCEDURE — 95886 MUSC TEST DONE W/N TEST COMP: CPT | Performed by: OTHER

## 2020-11-05 RX ORDER — ZOLPIDEM TARTRATE 10 MG/1
10 TABLET ORAL NIGHTLY
COMMUNITY
Start: 2020-10-26

## 2020-11-05 RX ORDER — LEVONORGESTREL AND ETHINYL ESTRADIOL AND ETHINYL ESTRADIOL 150-30(84)
KIT ORAL
COMMUNITY
Start: 2020-11-04 | End: 2021-06-01

## 2020-11-05 RX ORDER — GABAPENTIN 600 MG/1
TABLET ORAL
COMMUNITY
Start: 2020-10-23 | End: 2020-12-30

## 2020-11-05 NOTE — PROCEDURES
[facility] [place]  [address] [facility]  Phone- [phone]  Nerve Conduction & Electromyography Report            Patient: Robert Going Age: 22 Years 5 Months  Patient ID: [id number] Referring M.D.: King Zack A.Elbow ADM 8.39  9.7  86.9 ?70   A. Elbow - B. Elbow 13 57 ?49   R COMM PERONEAL - EDB      Ankle EDB 6.04 ?6.20 5.4 ?2.0 100  69.9 ?50.00 Ankle - EDB 8        Fib Head EDB 10.83  5.0  93.6 ?115 69.9  Fib Head - Ankle 23 48 ?39   L COMM PERONEAL - EDB This is a normal study. There is no electrodiagnostic evidence of a diffuse large fibre polyneuropathy, a R cervical or R lumbar radiculopathy or a myopathy at this time.  As the EMG of the RUE and RLE were completely normal a needle examination of the LUE

## 2020-11-20 ENCOUNTER — HOSPITAL ENCOUNTER (OUTPATIENT)
Age: 25
Discharge: STILL ADMITTED OR ADMINISTRATIVE | End: 2020-11-20
Payer: COMMERCIAL

## 2020-12-30 ENCOUNTER — OFFICE VISIT (OUTPATIENT)
Dept: FAMILY MEDICINE CLINIC | Facility: CLINIC | Age: 25
End: 2020-12-30
Payer: COMMERCIAL

## 2020-12-30 VITALS
WEIGHT: 121 LBS | DIASTOLIC BLOOD PRESSURE: 60 MMHG | BODY MASS INDEX: 22.26 KG/M2 | RESPIRATION RATE: 18 BRPM | HEIGHT: 62 IN | HEART RATE: 88 BPM | SYSTOLIC BLOOD PRESSURE: 110 MMHG

## 2020-12-30 DIAGNOSIS — M54.16 LUMBAR RADICULOPATHY: Primary | ICD-10-CM

## 2020-12-30 DIAGNOSIS — N93.9 ABNORMAL VAGINAL BLEEDING: ICD-10-CM

## 2020-12-30 DIAGNOSIS — R20.2 PARESTHESIAS: ICD-10-CM

## 2020-12-30 PROCEDURE — 3074F SYST BP LT 130 MM HG: CPT | Performed by: FAMILY MEDICINE

## 2020-12-30 PROCEDURE — 3078F DIAST BP <80 MM HG: CPT | Performed by: FAMILY MEDICINE

## 2020-12-30 PROCEDURE — 3008F BODY MASS INDEX DOCD: CPT | Performed by: FAMILY MEDICINE

## 2020-12-30 PROCEDURE — 99203 OFFICE O/P NEW LOW 30 MIN: CPT | Performed by: FAMILY MEDICINE

## 2020-12-30 RX ORDER — GABAPENTIN 600 MG/1
TABLET ORAL 2 TIMES DAILY
COMMUNITY
Start: 2020-10-23

## 2020-12-30 RX ORDER — KETOROLAC TROMETHAMINE 10 MG/1
10 TABLET, FILM COATED ORAL
COMMUNITY
Start: 2020-11-16 | End: 2020-12-30 | Stop reason: ALTCHOICE

## 2020-12-30 NOTE — PROGRESS NOTES
[address] [facility] Family Medicine Office Note  Chief Complaint:   No chief complaint on file. HPI:   This is a 22year old female coming in for persistent low back pain, numbness and tingling in her legs as well as abnormal vaginal bleeding.     1. disorder of initiating or maintaining sleep      Past Surgical History:   Procedure Laterality Date   • OTHER SURGICAL HISTORY  2013    Nasal bone Fx     Social History:  Social History    Tobacco Use      Smoking status: Never Smoker      Smokeless tobacc (54.9 kg)   LMP 11/01/2020 (Exact Date)   BMI 22.13 kg/m²  Estimated body mass index is 22.13 kg/m² as calculated from the following:    Height as of this encounter: 5' 2\" (1.575 m). Weight as of this encounter: 121 lb (54.9 kg).    Vital signs reviewed treatments as a result of today.      Problem List:  Patient Active Problem List:     Esophageal reflux     Loss of weight     Attention deficit disorder with hyperactivity(314.01)     Palpitations     Depression     Anxiety and depression     Cervicalgia

## 2021-01-06 NOTE — TELEPHONE ENCOUNTER
The medication was a trial to see it would help some of her symptoms, can you see if it helped, if not would recommend to discontinue it

## 2021-01-06 NOTE — TELEPHONE ENCOUNTER
Requested Prescriptions     Pending Prescriptions Disp Refills   • Hydroxychloroquine Sulfate 200 MG Oral Tab 90 tablet 0     Sig: Take 1 tablet (200 mg total) by mouth daily.      LAST EYE EXAM: 11/17/20  LF: 9/23/20 #90 TAB W/ 0 RF  LOV: 9/23/20   Ana Rosa vasquez 7.3   Albumin      3.4 - 5.0 g/dL 3.2 (L)   Globulin      2.8 - 4.4 g/dL 4.1   A/G Ratio      1.0 - 2.0 0.8 (L)       ASSESSMENT/PLAN:      27-year-old female reporting a constellation of symptoms starting March 2020 which consist of abdominal pain, lower

## 2021-01-07 RX ORDER — HYDROXYCHLOROQUINE SULFATE 200 MG/1
200 TABLET, FILM COATED ORAL DAILY
Qty: 90 TABLET | Refills: 0 | Status: SHIPPED | OUTPATIENT
Start: 2021-01-07 | End: 2021-04-22

## 2021-01-07 RX ORDER — HYDROXYCHLOROQUINE SULFATE 200 MG/1
200 TABLET, FILM COATED ORAL DAILY
Qty: 90 TABLET | Refills: 0 | Status: SHIPPED | OUTPATIENT
Start: 2021-01-07 | End: 2021-01-07

## 2021-02-03 ENCOUNTER — TELEPHONE (OUTPATIENT)
Dept: RHEUMATOLOGY | Facility: CLINIC | Age: 26
End: 2021-02-03

## 2021-02-03 NOTE — TELEPHONE ENCOUNTER
Last office visit note faxed. If additional records are needed office can contact Medical Records at [phone].

## 2021-02-03 NOTE — TELEPHONE ENCOUNTER
S[facility] Albarran states they need notes, xray if any and labs if any for the patient faxed from last visit         Fax# [phone]

## 2021-02-18 ENCOUNTER — HOSPITAL ENCOUNTER (OUTPATIENT)
Age: 26
Discharge: HOME | End: 2021-02-18
Payer: COMMERCIAL

## 2021-02-18 VITALS
WEIGHT: 115 LBS | TEMPERATURE: 100 F | HEIGHT: 62 IN | SYSTOLIC BLOOD PRESSURE: 131 MMHG | HEART RATE: 112 BPM | DIASTOLIC BLOOD PRESSURE: 87 MMHG | RESPIRATION RATE: 18 BRPM | OXYGEN SATURATION: 100 % | BODY MASS INDEX: 21.16 KG/M2

## 2021-02-18 DIAGNOSIS — K08.89 PAIN, DENTAL: Primary | ICD-10-CM

## 2021-02-18 DIAGNOSIS — J02.9 ACUTE PHARYNGITIS, UNSPECIFIED ETIOLOGY: ICD-10-CM

## 2021-02-18 LAB — POCT RAPID STREP: NEGATIVE

## 2021-02-18 PROCEDURE — 87081 CULTURE SCREEN ONLY: CPT | Performed by: PHYSICIAN ASSISTANT

## 2021-02-18 PROCEDURE — 99214 OFFICE O/P EST MOD 30 MIN: CPT | Performed by: PHYSICIAN ASSISTANT

## 2021-02-18 PROCEDURE — 87880 STREP A ASSAY W/OPTIC: CPT | Performed by: PHYSICIAN ASSISTANT

## 2021-02-18 RX ORDER — CYCLOBENZAPRINE HCL 10 MG
10 TABLET ORAL 3 TIMES DAILY PRN
Qty: 20 TABLET | Refills: 0 | Status: SHIPPED | OUTPATIENT
Start: 2021-02-18 | End: 2021-02-25

## 2021-02-18 RX ORDER — AMOXICILLIN 875 MG/1
875 TABLET, COATED ORAL 2 TIMES DAILY
Qty: 20 TABLET | Refills: 0 | Status: SHIPPED | OUTPATIENT
Start: 2021-02-18 | End: 2021-02-28

## 2021-02-18 RX ORDER — IBUPROFEN 600 MG/1
600 TABLET ORAL EVERY 8 HOURS PRN
Qty: 30 TABLET | Refills: 0 | Status: SHIPPED | OUTPATIENT
Start: 2021-02-18 | End: 2021-02-25

## 2021-02-18 RX ORDER — DEXTROAMPHETAMINE SACCHARATE, AMPHETAMINE ASPARTATE MONOHYDRATE, DEXTROAMPHETAMINE SULFATE AND AMPHETAMINE SULFATE 1.25; 1.25; 1.25; 1.25 MG/1; MG/1; MG/1; MG/1
5 CAPSULE, EXTENDED RELEASE ORAL EVERY MORNING
COMMUNITY

## 2021-02-18 NOTE — ED INITIAL ASSESSMENT (HPI)
Had bottom wisdom teeth removed last Thursday and today c/o throbbing pain to that area that radiates to her face, ears and neck. States it's difficult to swallow and eat.  Pt notified physician and she has taken T#3 and [employer]. Also has been taking Motrin w

## 2021-02-18 NOTE — ED PROVIDER NOTES
Patient Seen in: [facility]      History   Patient presents with:  Dental Problem    Stated Complaint: lower wisdom teeth and can not swallow . .swollen    HPI/Subjective:   HPI    31-year-old female who comes in today stating that a week Current:/87   Pulse 112   Temp 99.7 °F (37.6 °C)   Resp 18   Ht 157.5 cm (5' 2\")   Wt 52.2 kg   LMP 02/01/2021   SpO2 100%   BMI 21.03 kg/m²         Physical Exam    General Appearance: Alert, cooperative, no distress, appropriate for age   [place] [address]  [phone]    Schedule an appointment as soon as possible for a visit   If symptoms worsen          Medications Prescribed:  Discharge Medication List as of 2/18/2021 10:11 AM    START taking these medications    amoxicillin 875 MG Oral Tab  Take

## 2021-03-15 ENCOUNTER — TELEPHONE (OUTPATIENT)
Dept: RHEUMATOLOGY | Facility: CLINIC | Age: 26
End: 2021-03-15

## 2021-03-15 ENCOUNTER — HOSPITAL ENCOUNTER (OUTPATIENT)
Age: 26
Discharge: HOME | End: 2021-03-15
Payer: COMMERCIAL

## 2021-03-15 VITALS
DIASTOLIC BLOOD PRESSURE: 90 MMHG | SYSTOLIC BLOOD PRESSURE: 117 MMHG | HEART RATE: 100 BPM | RESPIRATION RATE: 12 BRPM | TEMPERATURE: 98 F | OXYGEN SATURATION: 98 %

## 2021-03-15 DIAGNOSIS — M79.10 MYALGIA: ICD-10-CM

## 2021-03-15 DIAGNOSIS — Z20.822 LAB TEST NEGATIVE FOR COVID-19 VIRUS: ICD-10-CM

## 2021-03-15 DIAGNOSIS — R53.83 FATIGUE, UNSPECIFIED TYPE: ICD-10-CM

## 2021-03-15 DIAGNOSIS — B34.9 VIRAL SYNDROME: Primary | ICD-10-CM

## 2021-03-15 LAB — SARS-COV-2 RNA RESP QL NAA+PROBE: NOT DETECTED

## 2021-03-15 PROCEDURE — 99212 OFFICE O/P EST SF 10 MIN: CPT

## 2021-03-15 PROCEDURE — 99213 OFFICE O/P EST LOW 20 MIN: CPT

## 2021-03-16 NOTE — TELEPHONE ENCOUNTER
Left message for patient to call back. We have appointments available 3/17 with Dr Arlin French for follow up.

## 2021-03-16 NOTE — TELEPHONE ENCOUNTER
She had my paged this evening. She was seen in Urgent Care several hours ago, and sent home. She was told to f/u with Rheumatology. This thinks she is having a lupus flare, with joint pain, stiffness, and headache. Her labs were negative.  Andrey Leon

## 2021-03-16 NOTE — TELEPHONE ENCOUNTER
Called patient, went straight to voice mail. Please help patient schedule for follow up with Dr Lino Reveles 3/17 when she returns call.

## 2021-03-16 NOTE — ED PROVIDER NOTES
Patient Seen in: [facility]      History   Patient presents with:   Body ache and/or chills    Stated Complaint: Weakness, joint pain and body aches 2 days    HPI/Subjective:   HPI  51-year-old female with a known history of lupus, anxiety Vitals [03/15/21 1902]   /90   Pulse 100   Resp 12   Temp 98 °F (36.7 °C)   Temp src Temporal   SpO2 98 %   O2 Device None (Room air)       Current:/90   Pulse 100   Temp 98 °F (36.7 °C) (Temporal)   Resp 12   LMP 02/01/2021   SpO2 98% explained the importance of following up with her doctor- [address] [place] [address]   - as instructed. The patient verbalized understanding of the discharge instructions and plan.

## 2024-03-20 ENCOUNTER — TELEPHONE (OUTPATIENT)
Dept: RHEUMATOLOGY | Facility: CLINIC | Age: 29
End: 2024-03-20

## 2024-03-20 NOTE — TELEPHONE ENCOUNTER
Received call from Susy, calling to refer pt to first available Rheumatologist. Stated we are accepting new pt's but currently booking out until June/July 2024. Susy verbalized understanding. Provided office fax. Awaiting referral from there office

## 2024-11-28 ENCOUNTER — APPOINTMENT (OUTPATIENT)
Dept: ULTRASOUND IMAGING | Facility: HOSPITAL | Age: 29
End: 2024-11-28
Attending: EMERGENCY MEDICINE
Payer: COMMERCIAL

## 2024-11-28 ENCOUNTER — HOSPITAL ENCOUNTER (EMERGENCY)
Facility: HOSPITAL | Age: 29
Discharge: HOME | End: 2024-11-28
Attending: EMERGENCY MEDICINE
Payer: COMMERCIAL

## 2024-11-28 ENCOUNTER — APPOINTMENT (OUTPATIENT)
Dept: CT IMAGING | Facility: HOSPITAL | Age: 29
End: 2024-11-28
Attending: EMERGENCY MEDICINE
Payer: COMMERCIAL

## 2024-11-28 VITALS
TEMPERATURE: 98 F | RESPIRATION RATE: 17 BRPM | SYSTOLIC BLOOD PRESSURE: 122 MMHG | BODY MASS INDEX: 25.76 KG/M2 | HEIGHT: 62 IN | DIASTOLIC BLOOD PRESSURE: 86 MMHG | HEART RATE: 1 BPM | WEIGHT: 140 LBS | OXYGEN SATURATION: 100 %

## 2024-11-28 DIAGNOSIS — R10.9 ABDOMINAL PAIN OF UNKNOWN ETIOLOGY: Primary | ICD-10-CM

## 2024-11-28 LAB
ALBUMIN SERPL-MCNC: 4.3 G/DL (ref 3.2–4.8)
ALBUMIN/GLOB SERPL: 1.5 {RATIO} (ref 1–2)
ALP LIVER SERPL-CCNC: 97 U/L
ALT SERPL-CCNC: 13 U/L
ANION GAP SERPL CALC-SCNC: 5 MMOL/L (ref 0–18)
AST SERPL-CCNC: 17 U/L (ref ?–34)
B-HCG UR QL: NEGATIVE
BASOPHILS # BLD AUTO: 0.08 X10(3) UL (ref 0–0.2)
BASOPHILS NFR BLD AUTO: 0.5 %
BILIRUB SERPL-MCNC: 0.2 MG/DL (ref 0.3–1.2)
BILIRUB UR QL STRIP.AUTO: NEGATIVE
BUN BLD-MCNC: 12 MG/DL (ref 9–23)
CALCIUM BLD-MCNC: 9.7 MG/DL (ref 8.7–10.4)
CHLORIDE SERPL-SCNC: 112 MMOL/L (ref 98–112)
CLARITY UR REFRACT.AUTO: CLEAR
CO2 SERPL-SCNC: 24 MMOL/L (ref 21–32)
COLOR UR AUTO: YELLOW
CREAT BLD-MCNC: 0.66 MG/DL
EGFRCR SERPLBLD CKD-EPI 2021: 122 ML/MIN/1.73M2 (ref 60–?)
EOSINOPHIL # BLD AUTO: 0.16 X10(3) UL (ref 0–0.7)
EOSINOPHIL NFR BLD AUTO: 0.9 %
ERYTHROCYTE [DISTWIDTH] IN BLOOD BY AUTOMATED COUNT: 12 %
GLOBULIN PLAS-MCNC: 2.9 G/DL (ref 2–3.5)
GLUCOSE BLD-MCNC: 77 MG/DL (ref 70–99)
GLUCOSE UR STRIP.AUTO-MCNC: NORMAL MG/DL
HCT VFR BLD AUTO: 42.1 %
HGB BLD-MCNC: 13.9 G/DL
IMM GRANULOCYTES # BLD AUTO: 0.1 X10(3) UL (ref 0–1)
IMM GRANULOCYTES NFR BLD: 0.6 %
KETONES UR STRIP.AUTO-MCNC: NEGATIVE MG/DL
LEUKOCYTE ESTERASE UR QL STRIP.AUTO: 75
LIPASE SERPL-CCNC: 39 U/L (ref 12–53)
LYMPHOCYTES # BLD AUTO: 1.56 X10(3) UL (ref 1–4)
LYMPHOCYTES NFR BLD AUTO: 8.9 %
MCH RBC QN AUTO: 30.2 PG (ref 26–34)
MCHC RBC AUTO-ENTMCNC: 33 G/DL (ref 31–37)
MCV RBC AUTO: 91.3 FL
MONOCYTES # BLD AUTO: 1.19 X10(3) UL (ref 0.1–1)
MONOCYTES NFR BLD AUTO: 6.8 %
NEUTROPHILS # BLD AUTO: 14.42 X10 (3) UL (ref 1.5–7.7)
NEUTROPHILS # BLD AUTO: 14.42 X10(3) UL (ref 1.5–7.7)
NEUTROPHILS NFR BLD AUTO: 82.3 %
NITRITE UR QL STRIP.AUTO: NEGATIVE
OSMOLALITY SERPL CALC.SUM OF ELEC: 291 MOSM/KG (ref 275–295)
PH UR STRIP.AUTO: 6 [PH] (ref 5–8)
PLATELET # BLD AUTO: 392 10(3)UL (ref 150–450)
POTASSIUM SERPL-SCNC: 4 MMOL/L (ref 3.5–5.1)
PROT SERPL-MCNC: 7.2 G/DL (ref 5.7–8.2)
PROT UR STRIP.AUTO-MCNC: 30 MG/DL
RBC # BLD AUTO: 4.61 X10(6)UL
SODIUM SERPL-SCNC: 141 MMOL/L (ref 136–145)
SP GR UR STRIP.AUTO: 1.02 (ref 1–1.03)
UROBILINOGEN UR STRIP.AUTO-MCNC: NORMAL MG/DL
WBC # BLD AUTO: 17.5 X10(3) UL (ref 4–11)

## 2024-11-28 PROCEDURE — 96374 THER/PROPH/DIAG INJ IV PUSH: CPT

## 2024-11-28 PROCEDURE — 76856 US EXAM PELVIC COMPLETE: CPT | Performed by: EMERGENCY MEDICINE

## 2024-11-28 PROCEDURE — 87086 URINE CULTURE/COLONY COUNT: CPT | Performed by: EMERGENCY MEDICINE

## 2024-11-28 PROCEDURE — 99284 EMERGENCY DEPT VISIT MOD MDM: CPT

## 2024-11-28 PROCEDURE — 81001 URINALYSIS AUTO W/SCOPE: CPT | Performed by: EMERGENCY MEDICINE

## 2024-11-28 PROCEDURE — 93975 VASCULAR STUDY: CPT | Performed by: EMERGENCY MEDICINE

## 2024-11-28 PROCEDURE — 80053 COMPREHEN METABOLIC PANEL: CPT | Performed by: EMERGENCY MEDICINE

## 2024-11-28 PROCEDURE — 76775 US EXAM ABDO BACK WALL LIM: CPT | Performed by: EMERGENCY MEDICINE

## 2024-11-28 PROCEDURE — 76830 TRANSVAGINAL US NON-OB: CPT | Performed by: EMERGENCY MEDICINE

## 2024-11-28 PROCEDURE — 81025 URINE PREGNANCY TEST: CPT

## 2024-11-28 PROCEDURE — 85025 COMPLETE CBC W/AUTO DIFF WBC: CPT | Performed by: EMERGENCY MEDICINE

## 2024-11-28 PROCEDURE — 83690 ASSAY OF LIPASE: CPT | Performed by: EMERGENCY MEDICINE

## 2024-11-28 PROCEDURE — 96361 HYDRATE IV INFUSION ADD-ON: CPT

## 2024-11-28 PROCEDURE — 74177 CT ABD & PELVIS W/CONTRAST: CPT | Performed by: EMERGENCY MEDICINE

## 2024-11-28 PROCEDURE — 96375 TX/PRO/DX INJ NEW DRUG ADDON: CPT

## 2024-11-28 RX ORDER — KETOROLAC TROMETHAMINE 15 MG/ML
15 INJECTION, SOLUTION INTRAMUSCULAR; INTRAVENOUS ONCE
Status: COMPLETED | OUTPATIENT
Start: 2024-11-28 | End: 2024-11-28

## 2024-11-28 RX ORDER — ONDANSETRON 2 MG/ML
4 INJECTION INTRAMUSCULAR; INTRAVENOUS ONCE
Status: COMPLETED | OUTPATIENT
Start: 2024-11-28 | End: 2024-11-28

## 2024-11-28 RX ORDER — AMLODIPINE BESYLATE 5 MG/1
5 TABLET ORAL DAILY
COMMUNITY

## 2024-11-28 RX ORDER — ONDANSETRON 4 MG/1
4 TABLET, ORALLY DISINTEGRATING ORAL EVERY 4 HOURS PRN
Qty: 10 TABLET | Refills: 0 | Status: SHIPPED | OUTPATIENT
Start: 2024-11-28 | End: 2024-12-05

## 2024-11-28 NOTE — ED INITIAL ASSESSMENT (HPI)
Pt to ER with lower back pain, mlq abd pain. Throwing up. Has been going on for 1x week. Intermittent for 1x week.

## 2024-11-28 NOTE — ED PROVIDER NOTES
Patient Seen in: [facility] Emergency Department      History     Chief Complaint   Patient presents with    Abdomen/Flank Pain    Back Pain     Stated Complaint: abd and back pain    Subjective:   HPI      29-year-old female presents today for evaluation of abdominal pain.  Patient is currently on her menstrual cycle.  Towards the end of last week, she began having some nausea and vomiting.  She thought that this was secondary to something she had eaten.  [place]  On the flight, she began having a suprapubic discomfort that radiates to her low back.  On the flight, she was given ketorolac and her symptoms had improved.  Since that time, she has had recurrent suprapubic discomfort along with nausea and vomiting.  She denies any fevers, hematuria, dysuria    Objective:     No pertinent past medical history.            No pertinent past surgical history.              No pertinent social history.                Physical Exam     ED Triage Vitals   BP 11/28/24 1611 111/82   Pulse 11/28/24 1610 98   Resp 11/28/24 1610 16   Temp 11/28/24 1610 97.5 °F (36.4 °C)   Temp src 11/28/24 1610 Oral   SpO2 11/28/24 1610 97 %   O2 Device 11/28/24 1610 None (Room air)       Current Vitals:   Vital Signs  BP: 122/86  Pulse: (!) 1  Resp: 17  Temp: 97.5 °F (36.4 °C)  Temp src: Oral  MAP (mmHg): 96    Oxygen Therapy  SpO2: 100 %  O2 Device: None (Room air)        Physical Exam  Vitals and nursing note reviewed.   Constitutional:       Appearance: Normal appearance.   HENT:      Head: Normocephalic.      Nose: Nose normal.      Mouth/Throat:      Mouth: Mucous membranes are moist.   Eyes:      Extraocular Movements: Extraocular movements intact.   Cardiovascular:      Rate and Rhythm: Normal rate.   Pulmonary:      Effort: Pulmonary effort is normal.   Abdominal:      General: Abdomen is flat.      Tenderness: There is generalized abdominal tenderness. There is no guarding or rebound.    Musculoskeletal:         General: Normal range of motion.   Skin:     General: Skin is warm.   Neurological:      General: No focal deficit present.      Mental Status: She is alert.   Psychiatric:         Mood and Affect: Mood normal.         ED Course     Labs Reviewed   CBC WITH DIFFERENTIAL WITH PLATELET - Abnormal; Notable for the following components:       Result Value    WBC 17.5 (*)     Neutrophil Absolute Prelim 14.42 (*)     Neutrophil Absolute 14.42 (*)     Monocyte Absolute 1.19 (*)     All other components within normal limits   COMP METABOLIC PANEL (14) - Abnormal; Notable for the following components:    Bilirubin, Total 0.2 (*)     All other components within normal limits   URINALYSIS WITH CULTURE REFLEX - Abnormal; Notable for the following components:    Blood Urine 1+ (*)     Protein Urine 30 (*)     Leukocyte Esterase Urine 75 (*)     WBC Urine 6-10 (*)     RBC Urine 3-5 (*)     Bacteria Urine 1+ (*)     Squamous Epi. Cells Few (*)     All other components within normal limits   LIPASE - Normal   POCT PREGNANCY URINE - Normal   URINE CULTURE, ROUTINE            CT ABDOMEN+PELVIS(CONTRAST ONLY)(CPT=74177)    Result Date: 11/28/2024  PROCEDURE:  CT ABDOMEN+PELVIS (CONTRAST ONLY) (CPT=74177)  COMPARISON:  [facility] , CT, CT ABDOMEN+PELVIS(CONTRAST ONLY)(CPT=74177), 10/04/2020, 3:28 AM.  [facility], CT, CT ABDOMEN+PELVIS(CONTRAST ONLY)(CPT=74177), 8/05/2020, 10:17 AM.  INDICATIONS:  abd and back pain  TECHNIQUE:  CT scanning was performed from the dome of the diaphragm to the pubic symphysis with non-ionic intravenous contrast material. Post contrast coronal MPR imaging was performed.  Dose reduction techniques were used. Dose information is transmitted to the ACR (American College of Radiology) NRDR (National Radiology Data Registry) which includes the Dose Index Registry.  PATIENT STATED HISTORY:(As transcribed by Technologist)  Pt c/o  lower abd pain   CONTRAST USED:  65cc of Isovue 370   FINDINGS:  LIVER:  No enlargement, atrophy, abnormal density, or significant focal lesion.  BILIARY:  No visible dilatation or calcification.  PANCREAS:  No lesion, fluid collection, ductal dilatation, or atrophy.  SPLEEN:  No enlargement or focal lesion.  KIDNEYS:  No mass, obstruction, or calcification.  Tiny cyst in the left upper pole of the kidney measures 4 mm. ADRENALS:  No mass or enlargement.  AORTA/VASCULAR:  No aneurysm or dissection.  RETROPERITONEUM:  No mass or adenopathy.  BOWEL/MESENTERY:  No visible mass, obstruction, or bowel wall thickening.    The appendix is unremarkable. ABDOMINAL WALL:  No mass or hernia.  URINARY BLADDER:  No visible focal wall thickening, lesion, or calculus.  PELVIC NODES:  No adenopathy.  PELVIC ORGANS:  No visible mass.  Pelvic organs appropriate for patient age.  BONES:  No bony lesion or fracture.  Chronic spondylolysis at L5 bilaterally with grade 1 anterolisthesis of L5 on S1. LUNG BASES:  No visible pulmonary or pleural disease.  OTHER:  Negative.             CONCLUSION:   No acute abdominal pelvic process.   LOCATION:  [facility]   Dictated by (CST): Guillermo Mendoza MD on 11/28/2024 at 7:10 PM     Finalized by (CST): Guillermo Mendoza MD on 11/28/2024 at 7:15 PM       US PELVIS EV W DOPPLER (CPT=76856/08766/17440)    Result Date: 11/28/2024  PROCEDURE:  US PELVIS EV W DOPPLER (CPT=76856/79632/75471)  COMPARISON:  US [facility], US PELVIS EV W DOPPLER (CPT=76856/59270/74352), 7/28/2020, 4:44 PM.  INDICATIONS:  abd and back pain  TECHNIQUE:  Ultrasound of the pelvis was performed with a transvaginal and transabdominal probe.  Doppler evaluation of the ovaries was performed of the ovarian arteries and veins.  B-mode images, Doppler color flow, and spectral waveform analysis were performed.  Transvaginal ultrasound was used to better evaluate adnexal and endometrial detail.  PATIENT STATED HISTORY: (As transcribed by Technologist)     MEASUREMENTS:        Uterus Length:                       5.18 cm x 3.60 cm x 2.86 cm        Endometrium Thickness:      0.31 cm and is unremarkable. Right Ovary:                         Not visualized due to overlying bowel gas. Left Ovary:                           1.97 cm x 1.15 cm x 1.16 cm  FINDINGS:  PELVIS  UTERUS:  Unremarkable appearance. RIGHT OVARY:  Not visualized due to overlying bowel gas.  No evidence of fluid collection or mass lesion. LEFT OVARY:  The left ovary appears normal in size, shape, and echogenicity.  No significant masses are identified.  CUL-DE-SAC:  Negative.  DOPPLER WAVE FORMS FLOW:  Only the left ovary was visualized.  There is normal arterial and venous Doppler wave forms in the left ovary.  The spectral analysis is within normal limits. OTHER:  Negative.            CONCLUSION:  The right ovary could not be visualized due to overlying bowel gas.  The uterus and left ovary are unremarkable.   LOCATION:  [facility]     Dictated by (CST): Guillermo Mendoza MD on 11/28/2024 at 6:21 PM     Finalized by (CST): Guillermo Menodza MD on 11/28/2024 at 6:22 PM       US KIDNEYS (CPT=76775)    Result Date: 11/28/2024  PROCEDURE:  US KIDNEYS (CPT=76775)  COMPARISON:  US [facility], US ABD COMPL W-O DOPPLER, 1/08/2009, 6:30 AM.  INDICATIONS:  abd and back pain.  Bilateral flank pain.  TECHNIQUE:  Transabdominal gray scale ultrasound imaging of the bilateral kidneys and bladder was performed.  Routine technique was utilized.   PATIENT STATED HISTORY: (As transcribed by Technologist)     FINDINGS:   RIGHT KIDNEY MEASUREMENTS:  9.8 x 4.0 x 4.3 cm ECHOGENICITY:  Normal. HYDRONEPHROSIS:  None. CYSTS/STONES/MASSES:  None.  LEFT KIDNEY MEASUREMENTS:  9.8 x 4.5 x 4.5 cm ECHOGENICITY:  Normal. HYDRONEPHROSIS:  None. CYSTS/STONES/MASSES:  None.  BLADDER:  Normal.  Urinary bladder is decompressed with a volume of 9 cc. OTHER:  Negative.            CONCLUSION:  No evidence of kidney stone or hydronephrosis.   LOCATION:  [facility]     Dictated by (CST): Guillermo Mendoza MD on  11/28/2024 at 6:20 PM     Finalized by (CST): Guillermo Mendoza MD on 11/28/2024 at 6:21 PM             [facility]      Differential Diagnosis  29-year-old female presents today for evaluation of 1 week of vomiting and abdominal pain.  The pain radiates to her low back.  There is no clear directionality of where the pain emanates from, right versus left.  Her abdomen is soft without guarding or rigidity.  She has generalized discomfort with palpation without any clear focal tenderness.  She states she just was on her menstrual cycle.  Differential would include ovarian cyst, ovarian torsion, fibroids, urinary tract infection.  Will obtain labs, UA along with ultrasound.  Will consider further imaging with CT scan depending on clinical course and findings today.    6:30 pm  Patient was noted to have leukocytosis.  On reassessment, patient reports she feels better.  She is no longer having pain and is not nauseous.  With the initial generalized abdominal discomfort on exam, presenting symptoms and leukocytosis, will obtain CT of the abdomen to assess for surgical or infectious pathology such as diverticulitis, abdominal abscess or appendicitis.    In the past 6 months, patient has been sexually active with 1 male partner.  She reports always using condoms.  She is not concerned for STI and denies any vaginal discharge.    7:49 pm  Patient's workup is reassuring.  On reassessment, she remains well-appearing without any significant symptoms.  We reviewed results and that although we do not have a clear source of her presenting symptoms, with her clinical improvement and reassuring work up, I do feel she is stable for DC home. I advised PCP and Gynecology follow-up if symptoms were to persist or recur.  Will DC    We discussed the leukocytosis, and the possibility of demarginalization from a stress reaction.  I advised that she have this repeated through her PCP    Patient's urine demonstrated 75 leukoesterase, 6-10 WBCs and 1+  bacteria.  There were few squamous cells so this may be contaminant.  She denies again any dysuria, hematuria or any urinary symptoms.  This will need to be sent out for culture    External Chart Reviewed  I reviewed documentation surrounding an ER visit in February 2023 for abdominal pain.  Patient had a CT scan of abdomen at that time did not demonstrate acute abnormality.        Medical Decision Making      Disposition and Plan     Clinical Impression:  1. Abdominal pain of unknown etiology         Disposition:  Discharge  11/28/2024  7:57 pm    Follow-up:  Fadi Santiago MD  [address] [place] [address]  [address]  [facility] [id number] [phone]    Call in 1 day(s)      Jami Avery MD  [address] [facility] [address]  [facility] [id number] [phone]    Call in 1 day(s)            Medications Prescribed:  Current Discharge Medication List        START taking these medications    Details   ondansetron 4 MG Oral Tablet Dispersible Take 1 tablet (4 mg total) by mouth every 4 (four) hours as needed for Nausea.  Qty: 10 tablet, Refills: 0                 Supplementary Documentation:

## 2024-11-29 NOTE — DISCHARGE INSTRUCTIONS
Follow-up with a primary care doctor within the next week for reassessment.  Follow-up with a gynecologist as well for any persistent pain.  Return to the ER for any worsening pain, vomiting, fevers or any other concerning symptoms.

## 2024-12-30 ENCOUNTER — HOSPITAL ENCOUNTER (OUTPATIENT)
Age: 29
Discharge: HOME | End: 2024-12-30
Attending: EMERGENCY MEDICINE
Payer: COMMERCIAL

## 2024-12-30 ENCOUNTER — APPOINTMENT (OUTPATIENT)
Dept: GENERAL RADIOLOGY | Age: 29
End: 2024-12-30
Attending: EMERGENCY MEDICINE
Payer: COMMERCIAL

## 2024-12-30 VITALS
HEART RATE: 90 BPM | TEMPERATURE: 98 F | DIASTOLIC BLOOD PRESSURE: 90 MMHG | OXYGEN SATURATION: 100 % | SYSTOLIC BLOOD PRESSURE: 135 MMHG | RESPIRATION RATE: 20 BRPM

## 2024-12-30 DIAGNOSIS — R19.7 DIARRHEA, UNSPECIFIED TYPE: ICD-10-CM

## 2024-12-30 DIAGNOSIS — B34.9 VIRAL SYNDROME: Primary | ICD-10-CM

## 2024-12-30 LAB
#MXD IC: 0.6 X10ˆ3/UL (ref 0.1–1)
BUN BLD-MCNC: 6 MG/DL (ref 7–18)
CHLORIDE BLD-SCNC: 106 MMOL/L (ref 98–112)
CO2 BLD-SCNC: 20 MMOL/L (ref 21–32)
CREAT BLD-MCNC: 0.6 MG/DL
EGFRCR SERPLBLD CKD-EPI 2021: 125 ML/MIN/1.73M2 (ref 60–?)
GLUCOSE BLD-MCNC: 104 MG/DL (ref 70–99)
HCT VFR BLD AUTO: 41.1 %
HCT VFR BLD CALC: 41 %
HGB BLD-MCNC: 13.7 G/DL
ISTAT IONIZED CALCIUM FOR CHEM 8: 1.12 MMOL/L (ref 1.12–1.32)
LYMPHOCYTES # BLD AUTO: 2.9 X10ˆ3/UL (ref 1–4)
LYMPHOCYTES NFR BLD AUTO: 40.5 %
MCH RBC QN AUTO: 30 PG (ref 26–34)
MCHC RBC AUTO-ENTMCNC: 33.3 G/DL (ref 31–37)
MCV RBC AUTO: 89.9 FL (ref 80–100)
MIXED CELL %: 7.8 %
NEUTROPHILS # BLD AUTO: 3.7 X10ˆ3/UL (ref 1.5–7.7)
NEUTROPHILS NFR BLD AUTO: 51.7 %
PLATELET # BLD AUTO: 380 X10ˆ3/UL (ref 150–450)
POCT INFLUENZA A: NEGATIVE
POCT INFLUENZA B: NEGATIVE
POTASSIUM BLD-SCNC: 4.1 MMOL/L (ref 3.6–5.1)
RBC # BLD AUTO: 4.57 X10ˆ6/UL
SARS-COV-2 RNA RESP QL NAA+PROBE: NOT DETECTED
SODIUM BLD-SCNC: 139 MMOL/L (ref 136–145)
WBC # BLD AUTO: 7.2 X10ˆ3/UL (ref 4–11)

## 2024-12-30 PROCEDURE — 87502 INFLUENZA DNA AMP PROBE: CPT | Performed by: EMERGENCY MEDICINE

## 2024-12-30 PROCEDURE — 99214 OFFICE O/P EST MOD 30 MIN: CPT

## 2024-12-30 PROCEDURE — 71046 X-RAY EXAM CHEST 2 VIEWS: CPT | Performed by: EMERGENCY MEDICINE

## 2024-12-30 PROCEDURE — 80047 BASIC METABLC PNL IONIZED CA: CPT

## 2024-12-30 PROCEDURE — 96374 THER/PROPH/DIAG INJ IV PUSH: CPT

## 2024-12-30 PROCEDURE — 96361 HYDRATE IV INFUSION ADD-ON: CPT

## 2024-12-30 PROCEDURE — 85025 COMPLETE CBC W/AUTO DIFF WBC: CPT | Performed by: EMERGENCY MEDICINE

## 2024-12-30 RX ORDER — ONDANSETRON 4 MG/1
4 TABLET, ORALLY DISINTEGRATING ORAL EVERY 4 HOURS PRN
Qty: 10 TABLET | Refills: 0 | Status: SHIPPED | OUTPATIENT
Start: 2024-12-30 | End: 2025-01-06

## 2024-12-30 RX ORDER — SODIUM CHLORIDE 9 MG/ML
1000 INJECTION, SOLUTION INTRAVENOUS ONCE
Status: COMPLETED | OUTPATIENT
Start: 2024-12-30 | End: 2024-12-30

## 2024-12-30 RX ORDER — ONDANSETRON 2 MG/ML
4 INJECTION INTRAMUSCULAR; INTRAVENOUS ONCE
Status: DISCONTINUED | OUTPATIENT
Start: 2024-12-30 | End: 2024-12-30

## 2024-12-30 RX ORDER — ONDANSETRON 2 MG/ML
4 INJECTION INTRAMUSCULAR; INTRAVENOUS ONCE
Status: COMPLETED | OUTPATIENT
Start: 2024-12-30 | End: 2024-12-30

## 2024-12-31 NOTE — DISCHARGE INSTRUCTIONS
Return for new or worsening symptoms such as fever, shortness of breath, inability to drink liquids, bloody stools

## 2024-12-31 NOTE — ED PROVIDER NOTES
Patient Seen in: [facility]      History     Chief Complaint   Patient presents with    Abdomen/Flank Pain     Stated Complaint: stomach pain    Subjective:   HPI      29-year-old with a history of ADHD, SLE, anxiety, depression, migraines, GERD, dysmenorrhea presents for evaluation of vomiting and diarrhea and URI symptoms.  Patient has been having abdominal pain with NVD for the past month, almost daily. Is more diarrhea than vomiting. Was seen in the ED on thanksgiving for it, followed up with her PCP and was seen by GI. Was prescribed an ABX but she started feeling sick so she didn't take them as prescribed. At the start of December, she developed cough, runny nose, sore throat congestion. Didn't get tested. Still having those symptoms. Has had intermittent fever, had one yesterday. No shortness of breath. Stopped her plaquenil 2 weeks ago thinking that might help. Is still coughing, no hemoptysis. Still having diarrhea daily.   GI note reviewed from 12/18, this notes a prior normal CT, suspected viral gastroenteritis with resulting postinfectious gastroparesis or IBS.  Patient was given a 1 week course of erythromycin  Objective:     Past Medical History:    Acute pharyngitis    Allergic rhinitis due to other allergen    Anxiety and depression    Attention deficit disorder with hyperactivity(314.01)    Decorative tattoo    Depression    Dysmenorrhea    Dysthymic disorder    Esophageal reflux    Headache(784.0)    Migraines    Loss of weight    Mental disorder    Nasal bones, closed fracture    Palpitations    Sprain and strain of unspecified site of hip and thigh    Tachycardia, unspecified    Transient disorder of initiating or maintaining sleep              Past Surgical History:   Procedure Laterality Date    Other surgical history  2013    Nasal bone Fx                Social History     Socioeconomic History    Marital status: Single   Tobacco Use    Smoking status: Never    Smokeless  tobacco: Never   Vaping Use    Vaping status: Never Used   Substance and Sexual Activity    Alcohol use: Yes     Comment: CAGE 9/9/20    Drug use: No    Sexual activity: Yes     Partners: Male     Birth control/protection: OCP   Other Topics Concern    Caffeine Concern Yes     Comment: rarely    Exercise No    Seat Belt Yes     Social Drivers of Health      Received from [facility]    Social Connections    Received from [facility]    Housing Stability              Review of Systems    Positive for stated complaint: stomach pain  Other systems are as noted in HPI.  Constitutional and vital signs reviewed.      All other systems reviewed and negative except as noted above.    Physical Exam     ED Triage Vitals [12/30/24 1902]   /90   Pulse 90   Resp 20   Temp 98 °F (36.7 °C)   Temp src Oral   SpO2 100 %   O2 Device None (Room air)       Current Vitals:   Vital Signs  BP: 135/90  Pulse: 90  Resp: 20  Temp: 98 °F (36.7 °C)  Temp src: Oral    Oxygen Therapy  SpO2: 100 %  O2 Device: None (Room air)        Physical Exam  General: Patient is awake, alert in no acute distress.   HEENT:  Sclera are not icteric.  Conjunctivae within normal limits.  Mucous members are moist.   Cardiovascular: Regular rate and rhythm, normal S1-S2.  Respiratory: Lungs are clear to auscultation bilaterally.   Abdomen: Soft, no focal tenderness, nondistended.  Skin: warm and dry, no diaphoresis  ED Course     Labs Reviewed   POCT ISTAT CHEM8 CARTRIDGE - Abnormal; Notable for the following components:       Result Value    ISTAT BUN 6 (*)     ISTAT Glucose 104 (*)     ISTAT TCO2 20 (*)     All other components within normal limits   POCT FLU TEST - Normal    Narrative:     This assay is a rapid molecular in vitro test utilizing nucleic acid amplification of influenza A and B viral RNA.   RAPID SARS-COV-2 BY PCR - Normal   POCT CBC     X-ray: I personally reviewed the radiographs and my individual  interpretation shows no consolidation.  I also reviewed the official report which showed no acute process.       Fluids and Zofran ordered       MDM      Work appears reassuring.  Although patient declined pregnancy test, symptoms not suggestive of ectopic pregnancy.  Pregnancy test was negative a month ago.  Recommend outpatient follow-up with GI as planned.        Medical Decision Making      Disposition and Plan     Clinical Impression:  1. Viral syndrome    2. Diarrhea, unspecified type         Disposition:  Discharge  12/30/2024  8:27 pm    Follow-up:  Aris Connolly MD  [address] [place] [address]  [facility] [address]  [phone]    Schedule an appointment as soon as possible for a visit in 3 days            Medications Prescribed:  Current Discharge Medication List              Supplementary Documentation:

## 2024-12-31 NOTE — ED INITIAL ASSESSMENT (HPI)
Presents for abdominal pain, vomiting, and diarrhea on and off since Thanksgiving. Has followed up with outpatient GI.

## 2025-05-18 ENCOUNTER — HOSPITAL ENCOUNTER (OUTPATIENT)
Age: 30
Discharge: HOME | End: 2025-05-18
Attending: EMERGENCY MEDICINE
Payer: COMMERCIAL

## 2025-05-18 VITALS
RESPIRATION RATE: 18 BRPM | HEIGHT: 62 IN | DIASTOLIC BLOOD PRESSURE: 88 MMHG | TEMPERATURE: 99 F | HEART RATE: 101 BPM | WEIGHT: 130 LBS | SYSTOLIC BLOOD PRESSURE: 129 MMHG | OXYGEN SATURATION: 100 % | BODY MASS INDEX: 23.92 KG/M2

## 2025-05-18 DIAGNOSIS — R11.2 NAUSEA AND VOMITING, UNSPECIFIED VOMITING TYPE: Primary | ICD-10-CM

## 2025-05-18 PROCEDURE — 99214 OFFICE O/P EST MOD 30 MIN: CPT

## 2025-05-18 PROCEDURE — 96374 THER/PROPH/DIAG INJ IV PUSH: CPT

## 2025-05-18 PROCEDURE — 96361 HYDRATE IV INFUSION ADD-ON: CPT

## 2025-05-18 RX ORDER — SODIUM CHLORIDE 9 MG/ML
1000 INJECTION, SOLUTION INTRAVENOUS ONCE
Status: COMPLETED | OUTPATIENT
Start: 2025-05-18 | End: 2025-05-18

## 2025-05-18 RX ORDER — ONDANSETRON 4 MG/1
4 TABLET, ORALLY DISINTEGRATING ORAL EVERY 6 HOURS PRN
Qty: 10 TABLET | Refills: 0 | Status: SHIPPED | OUTPATIENT
Start: 2025-05-18 | End: 2025-05-25

## 2025-05-18 RX ORDER — ONDANSETRON 2 MG/ML
4 INJECTION INTRAMUSCULAR; INTRAVENOUS ONCE
Status: COMPLETED | OUTPATIENT
Start: 2025-05-18 | End: 2025-05-18

## 2025-05-18 NOTE — ED PROVIDER NOTES
Patient Seen in: [facility]      History   No chief complaint on file.    Stated Complaint: Vomitting    Subjective:   HPI     A 30 year old female who presents with vomiting and abdominal pain after excessive alcohol consumption.    She consumed a large quantity of hard alcohol last night, stopping around 2 AM. Since this morning, she has experienced persistent vomiting, described as 'throwing up bile', and has been unable to retain any liquids, including water.    She reports abdominal pain, stating 'my stomach just hurts', without specifying a particular location. The symptoms began after the alcohol consumption and have persisted throughout the morning.        Objective:     No pertinent past medical history.            No pertinent past surgical history.              No pertinent social history.            Review of Systems    Positive for stated complaint: Vomitting  Other systems are as noted in HPI.  Constitutional and vital signs reviewed.      All other systems reviewed and negative except as noted above.                  Physical Exam     ED Triage Vitals [05/18/25 1256]   /88   Pulse 101   Resp 18   Temp 98.6 °F (37 °C)   Temp src Oral   SpO2 100 %   O2 Device None (Room air)       Current Vitals:   Vital Signs  BP: 129/88  Pulse: 101  Resp: 18  Temp: 98.6 °F (37 °C)  Temp src: Oral    Oxygen Therapy  SpO2: 100 %  O2 Device: None (Room air)          Physical Exam  Vitals and nursing note reviewed.   Constitutional:       Appearance: Normal appearance. She is well-developed.   HENT:      Head: Normocephalic and atraumatic.   Cardiovascular:      Rate and Rhythm: Normal rate and regular rhythm.   Pulmonary:      Effort: Pulmonary effort is normal. No respiratory distress.   Abdominal:      General: There is no distension.      Palpations: Abdomen is soft.      Tenderness: There is abdominal tenderness (mild diffuse).   Skin:     General: Skin is warm and dry.      Capillary Refill:  Capillary refill takes less than 2 seconds.   Neurological:      General: No focal deficit present.      Mental Status: She is alert.   Psychiatric:         Mood and Affect: Mood normal.         Behavior: Behavior normal.                  ED Course   Labs Reviewed - No data to display       Results                        MDM           Medical Decision Making  Alcohol induced vomiting, dehydration both in differential. IVF and zofran given in IC. Discharge home on zofran.     Disposition and Plan     Clinical Impression:  1. Nausea and vomiting, unspecified vomiting type         Disposition:  Discharge  5/18/2025  2:11 pm    Follow-up:  Isabel Avery MD  [address] [place] [id number] [phone]      As needed          Medications Prescribed:  Current Discharge Medication List        START taking these medications    Details   ondansetron 4 MG Oral Tablet Dispersible Take 1 tablet (4 mg total) by mouth every 6 (six) hours as needed for Nausea.  Qty: 10 tablet, Refills: 0                   Supplementary Documentation:

## (undated) NOTE — MR AVS SNAPSHOT
After Visit Summary   10/12/2019    [place] [facility]    MRN: [id number]           Visit Information     Date & Time  10/12/2019  9:30 AM Provider  JIMMY Baumann Department  [facility] [address], [address]  Dept.  Phone  [phone] help us do so. For more information on [employer], please visit [email or web address]                   DO YOU KNOW WHERE TO GO? Injury & illness are never convenient.  If you are dealing with a   non-emergency, consider your o